# Patient Record
Sex: FEMALE | Race: WHITE | NOT HISPANIC OR LATINO | Employment: OTHER | RURAL
[De-identification: names, ages, dates, MRNs, and addresses within clinical notes are randomized per-mention and may not be internally consistent; named-entity substitution may affect disease eponyms.]

---

## 2021-02-22 ENCOUNTER — HISTORICAL (OUTPATIENT)
Dept: ADMINISTRATIVE | Facility: HOSPITAL | Age: 81
End: 2021-02-22

## 2021-02-22 LAB — SARS-COV+SARS-COV-2 AG RESP QL IA.RAPID: NEGATIVE

## 2021-04-07 ENCOUNTER — HISTORICAL (OUTPATIENT)
Dept: ADMINISTRATIVE | Facility: HOSPITAL | Age: 81
End: 2021-04-07

## 2021-04-07 LAB — SARS-COV+SARS-COV-2 AG RESP QL IA.RAPID: POSITIVE

## 2021-05-25 ENCOUNTER — HOSPITAL ENCOUNTER (EMERGENCY)
Facility: HOSPITAL | Age: 81
Discharge: NURSING, REHAB OR LONG-TERM CARE | End: 2021-05-25
Attending: INTERNAL MEDICINE
Payer: MEDICARE

## 2021-05-25 VITALS
SYSTOLIC BLOOD PRESSURE: 137 MMHG | WEIGHT: 143 LBS | TEMPERATURE: 97 F | RESPIRATION RATE: 18 BRPM | BODY MASS INDEX: 22.44 KG/M2 | OXYGEN SATURATION: 93 % | HEIGHT: 67 IN | HEART RATE: 73 BPM | DIASTOLIC BLOOD PRESSURE: 53 MMHG

## 2021-05-25 DIAGNOSIS — R11.2 NON-INTRACTABLE VOMITING WITH NAUSEA, UNSPECIFIED VOMITING TYPE: ICD-10-CM

## 2021-05-25 DIAGNOSIS — R05.9 COUGH: ICD-10-CM

## 2021-05-25 DIAGNOSIS — K56.609 SMALL BOWEL OBSTRUCTION: Primary | ICD-10-CM

## 2021-05-25 DIAGNOSIS — R10.84 GENERALIZED ABDOMINAL PAIN: ICD-10-CM

## 2021-05-25 DIAGNOSIS — I10 HYPERTENSION: ICD-10-CM

## 2021-05-25 LAB
ALBUMIN SERPL BCP-MCNC: 4 G/DL (ref 3.5–5)
ALBUMIN/GLOB SERPL: 1.1 {RATIO}
ALP SERPL-CCNC: 99 U/L (ref 55–142)
ALT SERPL W P-5'-P-CCNC: 25 U/L (ref 13–56)
ANION GAP SERPL CALCULATED.3IONS-SCNC: 16 MMOL/L (ref 7–16)
AST SERPL W P-5'-P-CCNC: 18 U/L (ref 15–37)
BACTERIA #/AREA URNS HPF: ABNORMAL /HPF
BASOPHILS # BLD AUTO: 0 K/UL (ref 0–0.2)
BASOPHILS NFR BLD AUTO: 0 % (ref 0–1)
BILIRUB SERPL-MCNC: 0.6 MG/DL (ref 0–1.2)
BILIRUB UR QL STRIP: NEGATIVE
BUN SERPL-MCNC: 28 MG/DL (ref 7–18)
BUN/CREAT SERPL: 30 (ref 6–20)
CALCIUM SERPL-MCNC: 9.9 MG/DL (ref 8.5–10.1)
CHLORIDE SERPL-SCNC: 100 MMOL/L (ref 98–107)
CLARITY UR: CLEAR
CO2 SERPL-SCNC: 28 MMOL/L (ref 21–32)
COLOR UR: ABNORMAL
CREAT SERPL-MCNC: 0.94 MG/DL (ref 0.55–1.02)
DIFFERENTIAL METHOD BLD: ABNORMAL
EOSINOPHIL # BLD AUTO: 0 K/UL (ref 0–0.5)
EOSINOPHIL NFR BLD AUTO: 0 % (ref 1–4)
ERYTHROCYTE [DISTWIDTH] IN BLOOD BY AUTOMATED COUNT: 13.2 % (ref 11.5–14.5)
FINE GRAN CASTS #/AREA URNS LPF: ABNORMAL /LPF
GLOBULIN SER-MCNC: 3.5 G/DL (ref 2–4)
GLUCOSE SERPL-MCNC: 148 MG/DL (ref 74–106)
GLUCOSE UR STRIP-MCNC: NEGATIVE MG/DL
HCT VFR BLD AUTO: 42.8 % (ref 38–47)
HGB BLD-MCNC: 15.1 G/DL (ref 12–16)
HYALINE CASTS #/AREA URNS LPF: ABNORMAL /LPF
IMM GRANULOCYTES # BLD AUTO: 0.02 K/UL (ref 0–0.04)
IMM GRANULOCYTES NFR BLD: 0.2 % (ref 0–0.4)
KETONES UR STRIP-SCNC: 15 MG/DL
LEUKOCYTE ESTERASE UR QL STRIP: NEGATIVE
LIPASE SERPL-CCNC: 50 U/L (ref 73–393)
LYMPHOCYTES # BLD AUTO: 0.87 K/UL (ref 1–4.8)
LYMPHOCYTES NFR BLD AUTO: 7.9 % (ref 27–41)
MCH RBC QN AUTO: 31.7 PG (ref 27–31)
MCHC RBC AUTO-ENTMCNC: 35.3 G/DL (ref 32–36)
MCV RBC AUTO: 89.7 FL (ref 80–96)
MONOCYTES # BLD AUTO: 0.68 K/UL (ref 0–0.8)
MONOCYTES NFR BLD AUTO: 6.2 % (ref 2–6)
MPC BLD CALC-MCNC: 9.4 FL (ref 9.4–12.4)
MUCOUS THREADS #/AREA URNS HPF: ABNORMAL /HPF
NEUTROPHILS # BLD AUTO: 9.46 K/UL (ref 1.8–7.7)
NEUTROPHILS NFR BLD AUTO: 85.7 % (ref 53–65)
NITRITE UR QL STRIP: NEGATIVE
PH UR STRIP: 5.5 PH UNITS
PLATELET # BLD AUTO: 272 K/UL (ref 150–400)
POTASSIUM SERPL-SCNC: 3.6 MMOL/L (ref 3.5–5.1)
PROT SERPL-MCNC: 7.5 G/DL (ref 6.4–8.2)
PROT UR QL STRIP: 100
RBC # BLD AUTO: 4.77 M/UL (ref 4.2–5.4)
RBC # UR STRIP: ABNORMAL /UL
RBC #/AREA URNS HPF: ABNORMAL /HPF
SODIUM SERPL-SCNC: 140 MMOL/L (ref 136–145)
SP GR UR STRIP: 1.02
SQUAMOUS #/AREA URNS LPF: ABNORMAL /LPF
TRANS CELLS #/AREA URNS LPF: ABNORMAL /LPF
TROPONIN I SERPL-MCNC: <0.017 NG/ML
UROBILINOGEN UR STRIP-ACNC: 0.2 MG/DL
WBC # BLD AUTO: 11.03 K/UL (ref 4.5–11)
WBC #/AREA URNS HPF: ABNORMAL /HPF

## 2021-05-25 PROCEDURE — 25500020 PHARM REV CODE 255: Performed by: INTERNAL MEDICINE

## 2021-05-25 PROCEDURE — 36415 COLL VENOUS BLD VENIPUNCTURE: CPT | Performed by: INTERNAL MEDICINE

## 2021-05-25 PROCEDURE — 96375 TX/PRO/DX INJ NEW DRUG ADDON: CPT

## 2021-05-25 PROCEDURE — 99284 PR EMERGENCY DEPT VISIT,LEVEL IV: ICD-10-PCS | Mod: 25,,, | Performed by: INTERNAL MEDICINE

## 2021-05-25 PROCEDURE — 63600175 PHARM REV CODE 636 W HCPCS: Performed by: INTERNAL MEDICINE

## 2021-05-25 PROCEDURE — 25000003 PHARM REV CODE 250: Performed by: INTERNAL MEDICINE

## 2021-05-25 PROCEDURE — 83690 ASSAY OF LIPASE: CPT | Performed by: INTERNAL MEDICINE

## 2021-05-25 PROCEDURE — 96376 TX/PRO/DX INJ SAME DRUG ADON: CPT

## 2021-05-25 PROCEDURE — 81003 URINALYSIS AUTO W/O SCOPE: CPT | Performed by: INTERNAL MEDICINE

## 2021-05-25 PROCEDURE — 93010 EKG 12-LEAD: ICD-10-PCS | Mod: ,,, | Performed by: INTERNAL MEDICINE

## 2021-05-25 PROCEDURE — 93010 ELECTROCARDIOGRAM REPORT: CPT | Mod: ,,, | Performed by: INTERNAL MEDICINE

## 2021-05-25 PROCEDURE — 63600175 PHARM REV CODE 636 W HCPCS: Performed by: SPECIALIST

## 2021-05-25 PROCEDURE — 96374 THER/PROPH/DIAG INJ IV PUSH: CPT

## 2021-05-25 PROCEDURE — C9113 INJ PANTOPRAZOLE SODIUM, VIA: HCPCS | Performed by: INTERNAL MEDICINE

## 2021-05-25 PROCEDURE — 81001 URINALYSIS AUTO W/SCOPE: CPT | Performed by: INTERNAL MEDICINE

## 2021-05-25 PROCEDURE — 93005 ELECTROCARDIOGRAM TRACING: CPT

## 2021-05-25 PROCEDURE — 99285 EMERGENCY DEPT VISIT HI MDM: CPT | Mod: 25

## 2021-05-25 PROCEDURE — 99284 EMERGENCY DEPT VISIT MOD MDM: CPT | Mod: 25,,, | Performed by: INTERNAL MEDICINE

## 2021-05-25 PROCEDURE — 80053 COMPREHEN METABOLIC PANEL: CPT | Performed by: INTERNAL MEDICINE

## 2021-05-25 PROCEDURE — 85025 COMPLETE CBC W/AUTO DIFF WBC: CPT | Performed by: INTERNAL MEDICINE

## 2021-05-25 PROCEDURE — 84484 ASSAY OF TROPONIN QUANT: CPT | Performed by: INTERNAL MEDICINE

## 2021-05-25 RX ORDER — LIDOCAINE HYDROCHLORIDE 10 MG/ML
1 INJECTION, SOLUTION EPIDURAL; INFILTRATION; INTRACAUDAL; PERINEURAL ONCE
Status: DISCONTINUED | OUTPATIENT
Start: 2021-05-25 | End: 2021-05-25

## 2021-05-25 RX ORDER — DEXTROSE MONOHYDRATE, SODIUM CHLORIDE, AND POTASSIUM CHLORIDE 50; 1.49; 4.5 G/1000ML; G/1000ML; G/1000ML
INJECTION, SOLUTION INTRAVENOUS CONTINUOUS
Status: DISCONTINUED | OUTPATIENT
Start: 2021-05-25 | End: 2021-05-27

## 2021-05-25 RX ORDER — POLYETHYLENE GLYCOL 3350 17 G/17G
POWDER, FOR SOLUTION ORAL DAILY
COMMUNITY
End: 2021-05-25 | Stop reason: HOSPADM

## 2021-05-25 RX ORDER — TALC
6 POWDER (GRAM) TOPICAL NIGHTLY PRN
Status: DISCONTINUED | OUTPATIENT
Start: 2021-05-25 | End: 2021-05-27 | Stop reason: HOSPADM

## 2021-05-25 RX ORDER — OMEPRAZOLE 20 MG/1
20 TABLET, DELAYED RELEASE ORAL
COMMUNITY
End: 2023-12-11 | Stop reason: CLARIF

## 2021-05-25 RX ORDER — ONDANSETRON 2 MG/ML
4 INJECTION INTRAMUSCULAR; INTRAVENOUS
Status: COMPLETED | OUTPATIENT
Start: 2021-05-25 | End: 2021-05-25

## 2021-05-25 RX ORDER — MECLIZINE HYDROCHLORIDE 25 MG/1
25 TABLET ORAL
COMMUNITY

## 2021-05-25 RX ORDER — ACETAMINOPHEN 325 MG/1
650 TABLET ORAL EVERY 8 HOURS PRN
Status: DISCONTINUED | OUTPATIENT
Start: 2021-05-25 | End: 2021-05-27 | Stop reason: HOSPADM

## 2021-05-25 RX ORDER — MORPHINE SULFATE 2 MG/ML
2 INJECTION, SOLUTION INTRAMUSCULAR; INTRAVENOUS
Status: COMPLETED | OUTPATIENT
Start: 2021-05-25 | End: 2021-05-25

## 2021-05-25 RX ORDER — ACETAMINOPHEN 650 MG/1
650 SUPPOSITORY RECTAL EVERY 4 HOURS PRN
Status: DISCONTINUED | OUTPATIENT
Start: 2021-05-25 | End: 2021-05-27 | Stop reason: HOSPADM

## 2021-05-25 RX ORDER — ONDANSETRON 2 MG/ML
4 INJECTION INTRAMUSCULAR; INTRAVENOUS EVERY 12 HOURS PRN
Status: DISCONTINUED | OUTPATIENT
Start: 2021-05-25 | End: 2021-05-27 | Stop reason: HOSPADM

## 2021-05-25 RX ORDER — PANTOPRAZOLE SODIUM 40 MG/10ML
40 INJECTION, POWDER, LYOPHILIZED, FOR SOLUTION INTRAVENOUS
Status: COMPLETED | OUTPATIENT
Start: 2021-05-25 | End: 2021-05-25

## 2021-05-25 RX ORDER — LIDOCAINE HYDROCHLORIDE 10 MG/ML
1 INJECTION INFILTRATION; PERINEURAL ONCE
Status: DISCONTINUED | OUTPATIENT
Start: 2021-05-25 | End: 2021-05-27 | Stop reason: HOSPADM

## 2021-05-25 RX ORDER — LATANOPROST 50 UG/ML
1 SOLUTION/ DROPS OPHTHALMIC
COMMUNITY

## 2021-05-25 RX ORDER — SODIUM CHLORIDE 9 MG/ML
INJECTION, SOLUTION INTRAVENOUS
Status: COMPLETED | OUTPATIENT
Start: 2021-05-25 | End: 2021-05-25

## 2021-05-25 RX ADMIN — MORPHINE SULFATE 2 MG: 2 INJECTION, SOLUTION INTRAMUSCULAR; INTRAVENOUS at 08:05

## 2021-05-25 RX ADMIN — PANTOPRAZOLE SODIUM 40 MG: 40 INJECTION, POWDER, FOR SOLUTION INTRAVENOUS at 06:05

## 2021-05-25 RX ADMIN — ONDANSETRON 4 MG: 2 INJECTION INTRAMUSCULAR; INTRAVENOUS at 08:05

## 2021-05-25 RX ADMIN — SODIUM CHLORIDE: 0.9 INJECTION, SOLUTION INTRAVENOUS at 06:05

## 2021-05-25 RX ADMIN — IOPAMIDOL 85 ML: 755 INJECTION, SOLUTION INTRAVENOUS at 04:05

## 2021-05-25 RX ADMIN — ONDANSETRON 4 MG: 2 INJECTION INTRAMUSCULAR; INTRAVENOUS at 03:05

## 2021-05-26 ENCOUNTER — HOSPITAL ENCOUNTER (INPATIENT)
Facility: HOSPITAL | Age: 81
LOS: 1 days | Discharge: HOME | DRG: 390 | End: 2021-05-27
Attending: SURGERY | Admitting: SURGERY
Payer: MEDICARE

## 2021-05-26 DIAGNOSIS — K56.609 SBO (SMALL BOWEL OBSTRUCTION): Primary | ICD-10-CM

## 2021-05-26 LAB
ANION GAP SERPL CALCULATED.3IONS-SCNC: 10 MMOL/L (ref 7–16)
BUN SERPL-MCNC: 27 MG/DL (ref 7–18)
BUN/CREAT SERPL: 36 (ref 6–20)
CALCIUM SERPL-MCNC: 9 MG/DL (ref 8.5–10.1)
CHLORIDE SERPL-SCNC: 102 MMOL/L (ref 98–107)
CO2 SERPL-SCNC: 29 MMOL/L (ref 21–32)
CREAT SERPL-MCNC: 0.74 MG/DL (ref 0.55–1.02)
GLUCOSE SERPL-MCNC: 148 MG/DL (ref 74–106)
POTASSIUM SERPL-SCNC: 3.5 MMOL/L (ref 3.5–5.1)
SODIUM SERPL-SCNC: 137 MMOL/L (ref 136–145)

## 2021-05-26 PROCEDURE — 63600175 PHARM REV CODE 636 W HCPCS: Performed by: NURSE PRACTITIONER

## 2021-05-26 PROCEDURE — 25000003 PHARM REV CODE 250: Performed by: SURGERY

## 2021-05-26 PROCEDURE — 80048 BASIC METABOLIC PNL TOTAL CA: CPT | Performed by: SURGERY

## 2021-05-26 PROCEDURE — C9113 INJ PANTOPRAZOLE SODIUM, VIA: HCPCS | Performed by: NURSE PRACTITIONER

## 2021-05-26 PROCEDURE — 11000001 HC ACUTE MED/SURG PRIVATE ROOM

## 2021-05-26 PROCEDURE — 99222 PR INITIAL HOSPITAL CARE,LEVL II: ICD-10-PCS | Mod: ,,, | Performed by: NURSE PRACTITIONER

## 2021-05-26 PROCEDURE — 36415 COLL VENOUS BLD VENIPUNCTURE: CPT | Performed by: SURGERY

## 2021-05-26 PROCEDURE — 63600175 PHARM REV CODE 636 W HCPCS: Performed by: SURGERY

## 2021-05-26 PROCEDURE — 94761 N-INVAS EAR/PLS OXIMETRY MLT: CPT | Mod: GY

## 2021-05-26 PROCEDURE — 99222 1ST HOSP IP/OBS MODERATE 55: CPT | Mod: ,,, | Performed by: NURSE PRACTITIONER

## 2021-05-26 RX ORDER — PANTOPRAZOLE SODIUM 40 MG/10ML
40 INJECTION, POWDER, LYOPHILIZED, FOR SOLUTION INTRAVENOUS DAILY
Status: DISCONTINUED | OUTPATIENT
Start: 2021-05-26 | End: 2021-05-27

## 2021-05-26 RX ORDER — MORPHINE SULFATE 4 MG/ML
4 INJECTION, SOLUTION INTRAMUSCULAR; INTRAVENOUS EVERY 4 HOURS PRN
Status: DISCONTINUED | OUTPATIENT
Start: 2021-05-26 | End: 2021-05-27 | Stop reason: HOSPADM

## 2021-05-26 RX ORDER — ENOXAPARIN SODIUM 100 MG/ML
40 INJECTION SUBCUTANEOUS EVERY 24 HOURS
Status: DISCONTINUED | OUTPATIENT
Start: 2021-05-26 | End: 2021-05-27 | Stop reason: HOSPADM

## 2021-05-26 RX ADMIN — POTASSIUM CHLORIDE, DEXTROSE MONOHYDRATE AND SODIUM CHLORIDE: 150; 5; 450 INJECTION, SOLUTION INTRAVENOUS at 08:05

## 2021-05-26 RX ADMIN — POTASSIUM CHLORIDE, DEXTROSE MONOHYDRATE AND SODIUM CHLORIDE 125 ML/HR: 150; 5; 450 INJECTION, SOLUTION INTRAVENOUS at 12:05

## 2021-05-26 RX ADMIN — ACETAMINOPHEN 650 MG: 325 TABLET ORAL at 06:05

## 2021-05-26 RX ADMIN — ONDANSETRON 4 MG: 2 INJECTION INTRAMUSCULAR; INTRAVENOUS at 04:05

## 2021-05-26 RX ADMIN — ENOXAPARIN SODIUM 40 MG: 40 INJECTION SUBCUTANEOUS at 05:05

## 2021-05-26 RX ADMIN — MORPHINE SULFATE 4 MG: 4 INJECTION INTRAVENOUS at 08:05

## 2021-05-26 RX ADMIN — POTASSIUM CHLORIDE, DEXTROSE MONOHYDRATE AND SODIUM CHLORIDE: 150; 5; 450 INJECTION, SOLUTION INTRAVENOUS at 05:05

## 2021-05-26 RX ADMIN — PANTOPRAZOLE SODIUM 40 MG: 40 INJECTION, POWDER, FOR SOLUTION INTRAVENOUS at 08:05

## 2021-05-27 VITALS
RESPIRATION RATE: 16 BRPM | WEIGHT: 140 LBS | HEART RATE: 55 BPM | HEIGHT: 67 IN | TEMPERATURE: 97 F | BODY MASS INDEX: 21.97 KG/M2 | OXYGEN SATURATION: 99 % | SYSTOLIC BLOOD PRESSURE: 157 MMHG | DIASTOLIC BLOOD PRESSURE: 70 MMHG

## 2021-05-27 PROBLEM — K56.609 SBO (SMALL BOWEL OBSTRUCTION): Status: RESOLVED | Noted: 2021-05-25 | Resolved: 2021-05-27

## 2021-05-27 LAB
ANION GAP SERPL CALCULATED.3IONS-SCNC: 3 MMOL/L (ref 7–16)
BASOPHILS # BLD AUTO: 0.01 K/UL (ref 0–0.2)
BASOPHILS NFR BLD AUTO: 0.2 % (ref 0–1)
BUN SERPL-MCNC: 12 MG/DL (ref 7–18)
BUN/CREAT SERPL: 20 (ref 6–20)
CALCIUM SERPL-MCNC: 8.8 MG/DL (ref 8.5–10.1)
CHLORIDE SERPL-SCNC: 106 MMOL/L (ref 98–107)
CO2 SERPL-SCNC: 30 MMOL/L (ref 21–32)
CREAT SERPL-MCNC: 0.59 MG/DL (ref 0.55–1.02)
DIFFERENTIAL METHOD BLD: ABNORMAL
EOSINOPHIL # BLD AUTO: 0.06 K/UL (ref 0–0.5)
EOSINOPHIL NFR BLD AUTO: 1.2 % (ref 1–4)
ERYTHROCYTE [DISTWIDTH] IN BLOOD BY AUTOMATED COUNT: 13 % (ref 11.5–14.5)
GLUCOSE SERPL-MCNC: 112 MG/DL (ref 74–106)
HCT VFR BLD AUTO: 35 % (ref 38–47)
HGB BLD-MCNC: 11.4 G/DL (ref 12–16)
IMM GRANULOCYTES # BLD AUTO: 0.01 K/UL (ref 0–0.04)
IMM GRANULOCYTES NFR BLD: 0.2 % (ref 0–0.4)
LYMPHOCYTES # BLD AUTO: 1.42 K/UL (ref 1–4.8)
LYMPHOCYTES NFR BLD AUTO: 27.7 % (ref 27–41)
MCH RBC QN AUTO: 31.2 PG (ref 27–31)
MCHC RBC AUTO-ENTMCNC: 32.6 G/DL (ref 32–36)
MCV RBC AUTO: 95.9 FL (ref 80–96)
MONOCYTES # BLD AUTO: 0.53 K/UL (ref 0–0.8)
MONOCYTES NFR BLD AUTO: 10.3 % (ref 2–6)
MPC BLD CALC-MCNC: 9.9 FL (ref 9.4–12.4)
NEUTROPHILS # BLD AUTO: 3.1 K/UL (ref 1.8–7.7)
NEUTROPHILS NFR BLD AUTO: 60.4 % (ref 53–65)
NRBC # BLD AUTO: 0 X10E3/UL
NRBC, AUTO (.00): 0 %
PLATELET # BLD AUTO: 177 K/UL (ref 150–400)
POTASSIUM SERPL-SCNC: 4 MMOL/L (ref 3.5–5.1)
RBC # BLD AUTO: 3.65 M/UL (ref 4.2–5.4)
SODIUM SERPL-SCNC: 135 MMOL/L (ref 136–145)
WBC # BLD AUTO: 5.13 K/UL (ref 4.5–11)

## 2021-05-27 PROCEDURE — 63600175 PHARM REV CODE 636 W HCPCS: Performed by: NURSE PRACTITIONER

## 2021-05-27 PROCEDURE — 85025 COMPLETE CBC W/AUTO DIFF WBC: CPT | Performed by: NURSE PRACTITIONER

## 2021-05-27 PROCEDURE — 25000003 PHARM REV CODE 250: Performed by: SURGERY

## 2021-05-27 PROCEDURE — 63600175 PHARM REV CODE 636 W HCPCS: Performed by: SURGERY

## 2021-05-27 PROCEDURE — 80048 BASIC METABOLIC PNL TOTAL CA: CPT | Performed by: NURSE PRACTITIONER

## 2021-05-27 PROCEDURE — 36415 COLL VENOUS BLD VENIPUNCTURE: CPT | Performed by: NURSE PRACTITIONER

## 2021-05-27 PROCEDURE — C9113 INJ PANTOPRAZOLE SODIUM, VIA: HCPCS | Performed by: NURSE PRACTITIONER

## 2021-05-27 RX ORDER — PANTOPRAZOLE SODIUM 40 MG/1
40 TABLET, DELAYED RELEASE ORAL DAILY
Status: DISCONTINUED | OUTPATIENT
Start: 2021-05-28 | End: 2021-05-27 | Stop reason: HOSPADM

## 2021-05-27 RX ADMIN — ENOXAPARIN SODIUM 40 MG: 40 INJECTION SUBCUTANEOUS at 05:05

## 2021-05-27 RX ADMIN — ONDANSETRON 4 MG: 2 INJECTION INTRAMUSCULAR; INTRAVENOUS at 03:05

## 2021-05-27 RX ADMIN — PANTOPRAZOLE SODIUM 40 MG: 40 INJECTION, POWDER, FOR SOLUTION INTRAVENOUS at 10:05

## 2021-05-27 RX ADMIN — POTASSIUM CHLORIDE, DEXTROSE MONOHYDRATE AND SODIUM CHLORIDE: 150; 5; 450 INJECTION, SOLUTION INTRAVENOUS at 02:05

## 2021-06-16 ENCOUNTER — HOSPITAL ENCOUNTER (OUTPATIENT)
Dept: RADIOLOGY | Facility: HOSPITAL | Age: 81
Discharge: HOME | End: 2021-06-16
Attending: ORTHOPAEDIC SURGERY
Payer: MEDICARE

## 2021-06-16 DIAGNOSIS — M79.672 FOOT PAIN, LEFT: ICD-10-CM

## 2021-06-16 PROBLEM — S92.352A DISPLACED FRACTURE OF FIFTH METATARSAL BONE, LEFT FOOT, INITIAL ENCOUNTER FOR CLOSED FRACTURE: Status: ACTIVE | Noted: 2021-06-16

## 2022-12-29 ENCOUNTER — HOSPITAL ENCOUNTER (EMERGENCY)
Facility: HOSPITAL | Age: 82
Discharge: HOME | End: 2022-12-29
Attending: EMERGENCY MEDICINE
Payer: MEDICARE

## 2022-12-29 VITALS
RESPIRATION RATE: 20 BRPM | SYSTOLIC BLOOD PRESSURE: 166 MMHG | OXYGEN SATURATION: 98 % | DIASTOLIC BLOOD PRESSURE: 70 MMHG | HEIGHT: 67 IN | HEART RATE: 72 BPM | WEIGHT: 139.5 LBS | BODY MASS INDEX: 21.9 KG/M2 | TEMPERATURE: 98 F

## 2022-12-29 DIAGNOSIS — U07.1 COVID: Primary | ICD-10-CM

## 2022-12-29 LAB
ALBUMIN SERPL BCP-MCNC: 3.6 G/DL (ref 3.5–5)
ALBUMIN/GLOB SERPL: 1.2 {RATIO}
ALP SERPL-CCNC: 80 U/L (ref 55–142)
ALT SERPL W P-5'-P-CCNC: 20 U/L (ref 13–56)
ANION GAP SERPL CALCULATED.3IONS-SCNC: 12 MMOL/L (ref 7–16)
AST SERPL W P-5'-P-CCNC: 18 U/L (ref 15–37)
BACTERIA #/AREA URNS HPF: ABNORMAL /HPF
BASOPHILS # BLD AUTO: 0.01 K/UL (ref 0–0.2)
BASOPHILS NFR BLD AUTO: 0.3 % (ref 0–1)
BILIRUB SERPL-MCNC: 0.3 MG/DL (ref ?–1.2)
BILIRUB UR QL STRIP: NEGATIVE
BUN SERPL-MCNC: 11 MG/DL (ref 7–18)
BUN/CREAT SERPL: 17 (ref 6–20)
CALCIUM SERPL-MCNC: 8.9 MG/DL (ref 8.5–10.1)
CHLORIDE SERPL-SCNC: 101 MMOL/L (ref 98–107)
CLARITY UR: CLEAR
CO2 SERPL-SCNC: 28 MMOL/L (ref 21–32)
COLOR UR: YELLOW
CREAT SERPL-MCNC: 0.63 MG/DL (ref 0.55–1.02)
DIFFERENTIAL METHOD BLD: ABNORMAL
EGFR (NO RACE VARIABLE) (RUSH/TITUS): 90 ML/MIN/1.73M²
EOSINOPHIL # BLD AUTO: 0 K/UL (ref 0–0.5)
EOSINOPHIL NFR BLD AUTO: 0 % (ref 1–4)
ERYTHROCYTE [DISTWIDTH] IN BLOOD BY AUTOMATED COUNT: 12.9 % (ref 11.5–14.5)
FLUAV AG UPPER RESP QL IA.RAPID: NEGATIVE
FLUBV AG UPPER RESP QL IA.RAPID: NEGATIVE
GLOBULIN SER-MCNC: 2.9 G/DL (ref 2–4)
GLUCOSE SERPL-MCNC: 98 MG/DL (ref 74–106)
GLUCOSE UR STRIP-MCNC: NEGATIVE MG/DL
HCT VFR BLD AUTO: 37.1 % (ref 38–47)
HGB BLD-MCNC: 12.4 G/DL (ref 12–16)
IMM GRANULOCYTES # BLD AUTO: 0 K/UL (ref 0–0.04)
IMM GRANULOCYTES NFR BLD: 0 % (ref 0–0.4)
KETONES UR STRIP-SCNC: NEGATIVE MG/DL
LEUKOCYTE ESTERASE UR QL STRIP: NEGATIVE
LYMPHOCYTES # BLD AUTO: 0.69 K/UL (ref 1–4.8)
LYMPHOCYTES NFR BLD AUTO: 18.4 % (ref 27–41)
MCH RBC QN AUTO: 31.1 PG (ref 27–31)
MCHC RBC AUTO-ENTMCNC: 33.4 G/DL (ref 32–36)
MCV RBC AUTO: 93 FL (ref 80–96)
MONOCYTES # BLD AUTO: 0.57 K/UL (ref 0–0.8)
MONOCYTES NFR BLD AUTO: 15.2 % (ref 2–6)
MPC BLD CALC-MCNC: 9.3 FL (ref 9.4–12.4)
NEUTROPHILS # BLD AUTO: 2.49 K/UL (ref 1.8–7.7)
NEUTROPHILS NFR BLD AUTO: 66.1 % (ref 53–65)
NITRITE UR QL STRIP: NEGATIVE
PH UR STRIP: 6.5 PH UNITS
PLATELET # BLD AUTO: 187 K/UL (ref 150–400)
POTASSIUM SERPL-SCNC: 3.6 MMOL/L (ref 3.5–5.1)
PROT SERPL-MCNC: 6.5 G/DL (ref 6.4–8.2)
PROT UR QL STRIP: 30
RAPID GROUP A STREP: NEGATIVE
RBC # BLD AUTO: 3.99 M/UL (ref 4.2–5.4)
RBC # UR STRIP: ABNORMAL /UL
RBC #/AREA URNS HPF: ABNORMAL /HPF
SARS-COV+SARS-COV-2 AG RESP QL IA.RAPID: POSITIVE
SODIUM SERPL-SCNC: 137 MMOL/L (ref 136–145)
SP GR UR STRIP: 1.02
SQUAMOUS #/AREA URNS LPF: ABNORMAL /LPF
UROBILINOGEN UR STRIP-ACNC: 0.2 MG/DL
WBC # BLD AUTO: 3.76 K/UL (ref 4.5–11)
WBC #/AREA URNS HPF: ABNORMAL /HPF

## 2022-12-29 PROCEDURE — 94640 AIRWAY INHALATION TREATMENT: CPT

## 2022-12-29 PROCEDURE — 63600175 PHARM REV CODE 636 W HCPCS: Performed by: EMERGENCY MEDICINE

## 2022-12-29 PROCEDURE — 80053 COMPREHEN METABOLIC PANEL: CPT | Performed by: EMERGENCY MEDICINE

## 2022-12-29 PROCEDURE — 96372 THER/PROPH/DIAG INJ SC/IM: CPT | Performed by: EMERGENCY MEDICINE

## 2022-12-29 PROCEDURE — 36415 COLL VENOUS BLD VENIPUNCTURE: CPT | Performed by: EMERGENCY MEDICINE

## 2022-12-29 PROCEDURE — 85025 COMPLETE CBC W/AUTO DIFF WBC: CPT | Performed by: EMERGENCY MEDICINE

## 2022-12-29 PROCEDURE — 87081 CULTURE SCREEN ONLY: CPT | Performed by: EMERGENCY MEDICINE

## 2022-12-29 PROCEDURE — 81001 URINALYSIS AUTO W/SCOPE: CPT | Performed by: EMERGENCY MEDICINE

## 2022-12-29 PROCEDURE — 99284 EMERGENCY DEPT VISIT MOD MDM: CPT | Mod: EDII,,, | Performed by: EMERGENCY MEDICINE

## 2022-12-29 PROCEDURE — 87880 STREP A ASSAY W/OPTIC: CPT | Performed by: EMERGENCY MEDICINE

## 2022-12-29 PROCEDURE — 99284 EMERGENCY DEPT VISIT MOD MDM: CPT | Mod: 25

## 2022-12-29 PROCEDURE — 94760 N-INVAS EAR/PLS OXIMETRY 1: CPT

## 2022-12-29 PROCEDURE — 87428 SARSCOV & INF VIR A&B AG IA: CPT | Performed by: EMERGENCY MEDICINE

## 2022-12-29 PROCEDURE — 99284 PR EMERGENCY DEPT VISIT,LEVEL IV: ICD-10-PCS | Mod: EDII,,, | Performed by: EMERGENCY MEDICINE

## 2022-12-29 RX ORDER — ALBUTEROL SULFATE 90 UG/1
2 AEROSOL, METERED RESPIRATORY (INHALATION) EVERY 8 HOURS
Status: DISCONTINUED | OUTPATIENT
Start: 2022-12-29 | End: 2022-12-29 | Stop reason: HOSPADM

## 2022-12-29 RX ORDER — ESCITALOPRAM OXALATE 10 MG/1
10 TABLET ORAL
COMMUNITY
Start: 2022-12-16

## 2022-12-29 RX ORDER — METHYLPREDNISOLONE SOD SUCC 125 MG
125 VIAL (EA) INJECTION
Status: COMPLETED | OUTPATIENT
Start: 2022-12-29 | End: 2022-12-29

## 2022-12-29 RX ORDER — IPRATROPIUM BROMIDE AND ALBUTEROL SULFATE 2.5; .5 MG/3ML; MG/3ML
3 SOLUTION RESPIRATORY (INHALATION)
Status: DISCONTINUED | OUTPATIENT
Start: 2022-12-29 | End: 2022-12-29

## 2022-12-29 RX ORDER — METHYLPREDNISOLONE SOD SUCC 125 MG
125 VIAL (EA) INJECTION
Status: DISCONTINUED | OUTPATIENT
Start: 2022-12-29 | End: 2022-12-29

## 2022-12-29 RX ORDER — AZITHROMYCIN 250 MG/1
500 TABLET, FILM COATED ORAL DAILY
Qty: 14 TABLET | Refills: 0 | Status: SHIPPED | OUTPATIENT
Start: 2022-12-29 | End: 2023-01-05

## 2022-12-29 RX ORDER — ALBUTEROL SULFATE 90 UG/1
2 AEROSOL, METERED RESPIRATORY (INHALATION) EVERY 6 HOURS PRN
Qty: 6.7 G | Refills: 1 | Status: SHIPPED | OUTPATIENT
Start: 2022-12-29 | End: 2023-12-11 | Stop reason: CLARIF

## 2022-12-29 RX ADMIN — METHYLPREDNISOLONE SODIUM SUCCINATE 125 MG: 125 INJECTION, POWDER, FOR SOLUTION INTRAMUSCULAR; INTRAVENOUS at 11:12

## 2022-12-29 NOTE — LETTER
Patient: Yaritza Jiang  YOB: 1940  Date: 12/29/2022 Time: 12:56 PM  Location: [facility] Emergency Department    Leaving the Hospital Against Medical Advice    Chart #:[id number]    This will certify that I, the undersigned,    ______________________________________________________________________    A patient in the above named medical center, having requested discharge and removal from the medical center against the advice of my attending physician(s), hereby release [facility], its physicians, officers and employees, severally and individually, from any and all liability of any nature whatsoever for any injury or harm or complication of any kind that may result directly or indirectly, by reason of my terminating my stay as a patient at [facility] and my departure from [facility], and hereby waive any and all rights of action I may now have or later acquire as a result of my voluntary departure from [facility] and the termination of my stay as a patient therein.    This release is made with the full knowledge of the danger that may result from the action which I am taking.      Date:_______________________                         ___________________________                                                                                    Patient/Legal Representative    Witness:        ____________________________                          ___________________________  Nurse                                                                        Physician

## 2022-12-29 NOTE — ED TRIAGE NOTES
Cough, with headache and sore throat that started 2 days ago. Pt states that she sat with a person that has tested positive for covid.

## 2022-12-31 LAB — DEPRECATED S PYO AG THROAT QL EIA: NORMAL

## 2023-01-01 NOTE — ED PROVIDER NOTES
Encounter Date: 12/29/2022  Miss Jiang is an 82year old white female, presenting with nonproductive cough, sore throat, congestion, and headache for the past 2 days. 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     History     Chief Complaint   Patient presents with    Cough    COVID-19 Concerns    Sore Throat    Headache       Review of patient's allergies indicates:   Allergen Reactions    Aspirin     Codeine     Loratadine      Past Medical History:   Diagnosis Date    Acid reflux     Colorectal cancer 20 years ago    Unspecified glaucoma      Past Surgical History:   Procedure Laterality Date    COLON SURGERY      COLOSTOMY      EYE SURGERY      HYSTERECTOMY       History reviewed. No pertinent family history.  Social History     Tobacco Use    Smoking status: Heavy Smoker    Smokeless tobacco: Never   Substance Use Topics    Alcohol use: Never    Drug use: Never     Review of Systems   Constitutional: Negative.    HENT:  Positive for congestion, sinus pain and sore throat. Negative for hearing loss, mouth sores, nosebleeds, postnasal drip, rhinorrhea, sneezing, tinnitus, trouble swallowing and voice change.    Respiratory:  Positive for cough and wheezing. Negative for apnea, choking, chest tightness, shortness of breath and stridor.    Cardiovascular: Negative.    Gastrointestinal: Negative.    Endocrine: Negative.    Genitourinary: Negative.    Musculoskeletal: Negative.    Allergic/Immunologic: Negative.    Neurological: Negative.    Hematological: Negative.    Psychiatric/Behavioral: Negative.       Physical Exam     Initial Vitals [12/29/22 0946]   BP Pulse Resp Temp SpO2   (!) 139/55 79 20 98.2 °F (36.8 °C) 98 %      MAP       --         Physical Exam    Constitutional: She appears well-developed and well-nourished. She is not diaphoretic. No distress.   HENT:   Head: Normocephalic and atraumatic.   Right Ear: External ear normal.   Left Ear:  External ear normal.   Nose: Nose normal.   Mouth/Throat: Oropharynx is clear and moist. No oropharyngeal exudate.   Eyes: Conjunctivae and EOM are normal. Pupils are equal, round, and reactive to light. Right eye exhibits no discharge. Left eye exhibits no discharge. No scleral icterus.   Neck: Neck supple. No thyromegaly present. No tracheal deviation present. No JVD present.   Normal range of motion.  Cardiovascular:  Normal rate, regular rhythm, normal heart sounds and intact distal pulses.     Exam reveals no gallop and no friction rub.       No murmur heard.  Pulmonary/Chest: No stridor. No respiratory distress. She has wheezes. She has rhonchi. She has no rales. She exhibits no tenderness.   Abdominal: Abdomen is soft. Bowel sounds are normal. She exhibits no distension. There is no abdominal tenderness. There is no rebound and no guarding.   Musculoskeletal:         General: No tenderness or edema. Normal range of motion.      Cervical back: Normal range of motion and neck supple.     Lymphadenopathy:     She has no cervical adenopathy.   Neurological: She is alert and oriented to person, place, and time. She has normal strength and normal reflexes. She displays normal reflexes. No cranial nerve deficit or sensory deficit.   Skin: Skin is warm and dry. Capillary refill takes 2 to 3 seconds.   Psychiatric: She has a normal mood and affect. Her behavior is normal. Judgment and thought content normal.       Medical Screening Exam   See Full Note    ED Course   Procedures  Labs Reviewed   SARS-COV2 (COVID) W/ FLU ANTIGEN - Abnormal; Notable for the following components:       Result Value    COVID-19 Ag Positive (*)     All other components within normal limits    Narrative:     Positive SARS-CoV antigen results indicate the presence of viral antigens; correlation with patient history and presence of clinical signs & symptoms consistent with COVID-19 are necessary to determine infection status.   URINALYSIS,  REFLEX TO URINE CULTURE - Abnormal; Notable for the following components:    Protein, UA 30 (*)     Blood, UA Trace-Intact (*)     All other components within normal limits   CBC WITH DIFFERENTIAL - Abnormal; Notable for the following components:    WBC 3.76 (*)     RBC 3.99 (*)     Hematocrit 37.1 (*)     MCH 31.1 (*)     MPV 9.3 (*)     Neutrophils % 66.1 (*)     Lymphocytes % 18.4 (*)     Lymphocytes, Absolute 0.69 (*)     Monocytes % 15.2 (*)     Eosinophils % 0.0 (*)     All other components within normal limits   URINALYSIS, MICROSCOPIC - Abnormal; Notable for the following components:    Squamous Epithelial Cells, UA Occasional (*)     All other components within normal limits   THROAT SCREEN, RAPID STREP - Normal   CULTURE, STREP A,  THROAT - Normal   CBC W/ AUTO DIFFERENTIAL    Narrative:     The following orders were created for panel order CBC Auto Differential.  Procedure                               Abnormality         Status                     ---------                               -----------         ------                     CBC with Differential[[id number]]        Abnormal            Final result                 Please view results for these tests on the individual orders.   COMPREHENSIVE METABOLIC PANEL          Imaging Results              X-Ray Chest 1 View (Final result)  Result time 12/29/22 10:39:02      Final result by Justin Feldman DO (12/29/22 10:39:02)                   Impression:      Background of chronic lung change, similar. No focal airspace opacities.      Electronically signed by: Justin Feldman  Date:    12/29/2022  Time:    10:39               Narrative:    EXAMINATION:  XR CHEST 1 VIEW    CLINICAL HISTORY:  shortness of breath;    TECHNIQUE:  XR CHEST 1 VIEW    COMPARISON:  5/25/21    FINDINGS:  No lines or tubes.    Background of chronic lung change, similar.  No focal airspace opacities.    Normal pleura.    Cardiac silhouette is similar to comparison exam.    No  obvious acute bone findings.                                       Medications   methylPREDNISolone sodium succinate injection 125 mg (125 mg Intramuscular Given 12/29/22 1135)                       Clinical Impression:   Final diagnoses:  [U07.1] COVID (Primary)        ED Disposition Condition    Discharge Stable          ED Prescriptions       Medication Sig Dispense Start Date End Date Auth. Provider    azithromycin (Z-ANTHONY) 250 MG tablet Take 2 tablets (500 mg total) by mouth once daily. Take first 2 tablets together, then 1 every day until finished. for 7 days 14 tablet 12/29/2022 1/5/2023 Adin Palmer MD    albuterol (VENTOLIN HFA) 90 mcg/actuation inhaler Inhale 2 puffs into the lungs every 6 (six) hours as needed for Wheezing. Rescue 6.7 g 12/29/2022 -- Adin Palmer MD          Follow-up Information       Follow up With Specialties Details Why Contact Info    Mara Jo MD Family Medicine  As needed [id number]  [facility]   [place] [id number] [phone]               Adin Palmer MD  01/01/23 0055

## 2023-12-11 ENCOUNTER — HOSPITAL ENCOUNTER (EMERGENCY)
Facility: HOSPITAL | Age: 83
Discharge: ANOTHER HOSPITAL | End: 2023-12-11
Attending: INTERNAL MEDICINE
Payer: MEDICARE

## 2023-12-11 VITALS
HEIGHT: 67 IN | DIASTOLIC BLOOD PRESSURE: 47 MMHG | WEIGHT: 144 LBS | RESPIRATION RATE: 18 BRPM | TEMPERATURE: 99 F | OXYGEN SATURATION: 95 % | SYSTOLIC BLOOD PRESSURE: 133 MMHG | HEART RATE: 75 BPM | BODY MASS INDEX: 22.6 KG/M2

## 2023-12-11 DIAGNOSIS — R11.2 NAUSEA & VOMITING: ICD-10-CM

## 2023-12-11 DIAGNOSIS — S80.01XA CONTUSION OF RIGHT KNEE, INITIAL ENCOUNTER: ICD-10-CM

## 2023-12-11 DIAGNOSIS — R53.1 WEAKNESS: ICD-10-CM
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
## 2023-12-11 DIAGNOSIS — K56.609 SMALL BOWEL OBSTRUCTION: Primary | ICD-10-CM

## 2023-12-11 DIAGNOSIS — N39.0 URINARY TRACT INFECTION WITHOUT HEMATURIA, SITE UNSPECIFIED: ICD-10-CM

## 2023-12-11 LAB
ALBUMIN SERPL BCP-MCNC: 4 G/DL (ref 3.5–5)
ALBUMIN/GLOB SERPL: 1.1 {RATIO}
ALP SERPL-CCNC: 102 U/L (ref 55–142)
ALT SERPL W P-5'-P-CCNC: 18 U/L (ref 13–56)
ANION GAP SERPL CALCULATED.3IONS-SCNC: 13 MMOL/L (ref 7–16)
AST SERPL W P-5'-P-CCNC: 20 U/L (ref 15–37)
BACTERIA #/AREA URNS HPF: ABNORMAL /HPF
BASOPHILS # BLD AUTO: 0.01 K/UL (ref 0–0.2)
BASOPHILS NFR BLD AUTO: 0.1 % (ref 0–1)
BILIRUB SERPL-MCNC: 0.6 MG/DL (ref ?–1.2)
BILIRUB UR QL STRIP: ABNORMAL
BUN SERPL-MCNC: 24 MG/DL (ref 7–18)
BUN/CREAT SERPL: 39 (ref 6–20)
CALCIUM SERPL-MCNC: 10 MG/DL (ref 8.5–10.1)
CHLORIDE SERPL-SCNC: 99 MMOL/L (ref 98–107)
CLARITY UR: ABNORMAL
CO2 SERPL-SCNC: 29 MMOL/L (ref 21–32)
COLOR UR: YELLOW
CREAT SERPL-MCNC: 0.62 MG/DL (ref 0.55–1.02)
DIFFERENTIAL METHOD BLD: ABNORMAL
EGFR (NO RACE VARIABLE) (RUSH/TITUS): 90 ML/MIN/1.73M2
EOSINOPHIL # BLD AUTO: 0.01 K/UL (ref 0–0.5)
EOSINOPHIL NFR BLD AUTO: 0.1 % (ref 1–4)
ERYTHROCYTE [DISTWIDTH] IN BLOOD BY AUTOMATED COUNT: 12.5 % (ref 11.5–14.5)
GLOBULIN SER-MCNC: 3.6 G/DL (ref 2–4)
GLUCOSE SERPL-MCNC: 137 MG/DL (ref 74–106)
GLUCOSE UR STRIP-MCNC: NEGATIVE MG/DL
HCT VFR BLD AUTO: 43.1 % (ref 38–47)
HGB BLD-MCNC: 14.8 G/DL (ref 12–16)
IMM GRANULOCYTES # BLD AUTO: 0.03 K/UL (ref 0–0.04)
IMM GRANULOCYTES NFR BLD: 0.3 % (ref 0–0.4)
KETONES UR STRIP-SCNC: 40 MG/DL
LEUKOCYTE ESTERASE UR QL STRIP: NEGATIVE
LIPASE SERPL-CCNC: 22 U/L (ref 16–77)
LYMPHOCYTES # BLD AUTO: 0.71 K/UL (ref 1–4.8)
LYMPHOCYTES NFR BLD AUTO: 7.8 % (ref 27–41)
MCH RBC QN AUTO: 31.2 PG (ref 27–31)
MCHC RBC AUTO-ENTMCNC: 34.3 G/DL (ref 32–36)
MCV RBC AUTO: 90.7 FL (ref 80–96)
MONOCYTES # BLD AUTO: 0.66 K/UL (ref 0–0.8)
MONOCYTES NFR BLD AUTO: 7.2 % (ref 2–6)
MPC BLD CALC-MCNC: 9.5 FL (ref 9.4–12.4)
NEUTROPHILS # BLD AUTO: 7.71 K/UL (ref 1.8–7.7)
NEUTROPHILS NFR BLD AUTO: 84.5 % (ref 53–65)
NITRITE UR QL STRIP: POSITIVE
NRBC # BLD AUTO: 0 X10E3/UL
NRBC, AUTO (.00): 0 %
PH UR STRIP: 5.5 PH UNITS
PLATELET # BLD AUTO: 270 K/UL (ref 150–400)
POTASSIUM SERPL-SCNC: 3.8 MMOL/L (ref 3.5–5.1)
PROT SERPL-MCNC: 7.6 G/DL (ref 6.4–8.2)
PROT UR QL STRIP: 100
RBC # BLD AUTO: 4.75 M/UL (ref 4.2–5.4)
RBC # UR STRIP: ABNORMAL /UL
RBC #/AREA URNS HPF: ABNORMAL /HPF
SODIUM SERPL-SCNC: 137 MMOL/L (ref 136–145)
SP GR UR STRIP: >=1.03
SQUAMOUS #/AREA URNS LPF: ABNORMAL /LPF
TROPONIN I SERPL DL<=0.01 NG/ML-MCNC: 6.5 PG/ML
UROBILINOGEN UR STRIP-ACNC: 1 MG/DL
WBC # BLD AUTO: 9.13 K/UL (ref 4.5–11)
WBC #/AREA URNS HPF: ABNORMAL /HPF

## 2023-12-11 PROCEDURE — 25000003 PHARM REV CODE 250: Performed by: INTERNAL MEDICINE

## 2023-12-11 PROCEDURE — 93010 EKG 12-LEAD: ICD-10-PCS | Mod: ,,, | Performed by: INTERNAL MEDICINE

## 2023-12-11 PROCEDURE — 81001 URINALYSIS AUTO W/SCOPE: CPT | Performed by: INTERNAL MEDICINE

## 2023-12-11 PROCEDURE — 80053 COMPREHEN METABOLIC PANEL: CPT | Performed by: INTERNAL MEDICINE

## 2023-12-11 PROCEDURE — 96375 TX/PRO/DX INJ NEW DRUG ADDON: CPT

## 2023-12-11 PROCEDURE — 93005 ELECTROCARDIOGRAM TRACING: CPT

## 2023-12-11 PROCEDURE — 85025 COMPLETE CBC W/AUTO DIFF WBC: CPT | Performed by: INTERNAL MEDICINE

## 2023-12-11 PROCEDURE — 99285 EMERGENCY DEPT VISIT HI MDM: CPT | Performed by: EMERGENCY MEDICINE

## 2023-12-11 PROCEDURE — 87186 SC STD MICRODIL/AGAR DIL: CPT | Performed by: INTERNAL MEDICINE

## 2023-12-11 PROCEDURE — 83690 ASSAY OF LIPASE: CPT | Performed by: INTERNAL MEDICINE

## 2023-12-11 PROCEDURE — 93010 ELECTROCARDIOGRAM REPORT: CPT | Mod: ,,, | Performed by: INTERNAL MEDICINE

## 2023-12-11 PROCEDURE — 96376 TX/PRO/DX INJ SAME DRUG ADON: CPT

## 2023-12-11 PROCEDURE — 96365 THER/PROPH/DIAG IV INF INIT: CPT

## 2023-12-11 PROCEDURE — 99285 EMERGENCY DEPT VISIT HI MDM: CPT | Mod: 25

## 2023-12-11 PROCEDURE — 84484 ASSAY OF TROPONIN QUANT: CPT | Performed by: INTERNAL MEDICINE

## 2023-12-11 PROCEDURE — 87077 CULTURE AEROBIC IDENTIFY: CPT | Performed by: INTERNAL MEDICINE

## 2023-12-11 PROCEDURE — 63600175 PHARM REV CODE 636 W HCPCS: Performed by: INTERNAL MEDICINE

## 2023-12-11 PROCEDURE — 96372 THER/PROPH/DIAG INJ SC/IM: CPT | Performed by: INTERNAL MEDICINE

## 2023-12-11 PROCEDURE — 96361 HYDRATE IV INFUSION ADD-ON: CPT

## 2023-12-11 PROCEDURE — 63600175 PHARM REV CODE 636 W HCPCS: Performed by: EMERGENCY MEDICINE

## 2023-12-11 RX ORDER — ORPHENADRINE CITRATE 30 MG/ML
30 INJECTION INTRAMUSCULAR; INTRAVENOUS
Status: COMPLETED | OUTPATIENT
Start: 2023-12-11 | End: 2023-12-11

## 2023-12-11 RX ORDER — MORPHINE SULFATE 2 MG/ML
2 INJECTION, SOLUTION INTRAMUSCULAR; INTRAVENOUS
Status: COMPLETED | OUTPATIENT
Start: 2023-12-11 | End: 2023-12-11

## 2023-12-11 RX ORDER — ONDANSETRON 2 MG/ML
4 INJECTION INTRAMUSCULAR; INTRAVENOUS
Status: COMPLETED | OUTPATIENT
Start: 2023-12-11 | End: 2023-12-11

## 2023-12-11 RX ORDER — PROCHLORPERAZINE EDISYLATE 5 MG/ML
5 INJECTION INTRAMUSCULAR; INTRAVENOUS
Status: COMPLETED | OUTPATIENT
Start: 2023-12-11 | End: 2023-12-11

## 2023-12-11 RX ADMIN — ONDANSETRON 4 MG: 2 INJECTION INTRAMUSCULAR; INTRAVENOUS at 04:12

## 2023-12-11 RX ADMIN — CEFTRIAXONE SODIUM 1 G: 1 INJECTION, POWDER, FOR SOLUTION INTRAMUSCULAR; INTRAVENOUS at 06:12

## 2023-12-11 RX ADMIN — ORPHENADRINE CITRATE 30 MG: 60 INJECTION INTRAMUSCULAR; INTRAVENOUS at 05:12

## 2023-12-11 RX ADMIN — SODIUM CHLORIDE 1000 ML: 9 INJECTION, SOLUTION INTRAVENOUS at 04:12

## 2023-12-11 RX ADMIN — ONDANSETRON 4 MG: 2 INJECTION INTRAMUSCULAR; INTRAVENOUS at 05:12

## 2023-12-11 RX ADMIN — MORPHINE SULFATE 2 MG: 2 INJECTION, SOLUTION INTRAMUSCULAR; INTRAVENOUS at 09:12

## 2023-12-11 RX ADMIN — PROCHLORPERAZINE EDISYLATE 5 MG: 5 INJECTION INTRAMUSCULAR; INTRAVENOUS at 09:12

## 2023-12-11 NOTE — ED TRIAGE NOTES
PT TO ER WITH C/O VOMITING TODAY AND ABD PAIN - PT STATES SHE HAS A COLOSTOMY AND SHE TOOK SOME MIRALAX LAST PM AND THIS AM - PT STATES SHE FELL ON YESTERDAY AFTER RETURNING HOME FROM [place]- PT STATES HER KNEE GAVE WAY AND SHE FELL -C/O INCREASED PAIN AND TENDERNESS TO R KNEE WITH EDEMA

## 2023-12-11 NOTE — ED PROVIDER NOTES
Encounter Date: 12/11/2023       History     Chief Complaint   Patient presents with    Vomiting    Abdominal Pain    Fall    Knee Injury     Patient comes in complaining of generalized weakness and nausea vomiting for the last 1 day.  Has gotten weak and fell and hit her right knee on yesterday.  She has had some problems with the knee for the last 4 weeks.  States he sometimes give out on her and there was fluid in it when she saw her primary care provider.  She denies any chest pain, shortness breath, fever chills, no focal neurologic deficits including incontinence urine or bowel.  Patient has colostomy bag in the history of small-bowel obstruction in the past.        Review of patient's allergies indicates:   Allergen Reactions    Aspirin     Codeine     Loratadine      Past Medical History:   Diagnosis Date    Acid reflux     Colorectal cancer 20 years ago    Unspecified glaucoma      Past Surgical History:   Procedure Laterality Date    COLON SURGERY      COLOSTOMY      EYE SURGERY      HYSTERECTOMY       History reviewed. No pertinent family history.  Social History     Tobacco Use    Smoking status: Former     Types: Cigarettes    Smokeless tobacco: Never   Substance Use Topics    Alcohol use: Never    Drug use: Never     Review of Systems   Constitutional:  Negative for fever.   HENT:  Negative for sore throat.    Respiratory:  Negative for shortness of breath.    Cardiovascular:  Negative for chest pain.   Gastrointestinal:  Negative for nausea.   Genitourinary:  Negative for dysuria.   Musculoskeletal:  Negative for back pain.   Skin:  Negative for rash.   Neurological:  Negative for weakness.   Hematological:  Does not bruise/bleed easily.       Physical Exam     Initial Vitals [12/11/23 1541]   BP Pulse Resp Temp SpO2   139/63 106 20 98.5 °F (36.9 °C) 96 %      MAP       --         Physical Exam    Vitals reviewed.  Constitutional: She appears well-developed.   Eyes: Pupils are equal, round, and  reactive to light.   Neck:   Normal range of motion.  Cardiovascular:  Normal rate, regular rhythm, normal heart sounds and normal pulses.           Pulmonary/Chest: Effort normal and breath sounds normal.   Abdominal: Abdomen is soft and flat. She exhibits distension. Bowel sounds are decreased. There is abdominal tenderness.   Has colostomy bag in place   Musculoskeletal:         General: Normal range of motion.      Cervical back: Normal range of motion.      Right knee: No swelling. Normal range of motion. Tenderness present.        Legs:       Comments: Neurovascular function normal distally, no effusion, some tenderness but no deformity     Neurological: She is alert. She has normal strength and normal reflexes. GCS eye subscore is 4. GCS verbal subscore is 5. GCS motor subscore is 6.   Skin: Skin is warm.   Psychiatric: She has a normal mood and affect.         Medical Screening Exam   See Full Note    ED Course   Procedures  Labs Reviewed   COMPREHENSIVE METABOLIC PANEL - Abnormal; Notable for the following components:       Result Value    Glucose 137 (*)     BUN 24 (*)     BUN/Creatinine Ratio 39 (*)     All other components within normal limits   URINALYSIS, REFLEX TO URINE CULTURE - Abnormal; Notable for the following components:    Nitrites, UA Positive (*)     Protein,  (*)     Ketones, UA 40 (*)     Bilirubin, UA Small (*)     Blood, UA Trace-Intact (*)     Specific Gravity, UA >=1.030 (*)     All other components within normal limits   CBC WITH DIFFERENTIAL - Abnormal; Notable for the following components:    MCH 31.2 (*)     Neutrophils % 84.5 (*)     Lymphocytes % 7.8 (*)     Monocytes % 7.2 (*)     Eosinophils % 0.1 (*)     Neutrophils, Abs 7.71 (*)     Lymphocytes, Absolute 0.71 (*)     All other components within normal limits   URINALYSIS, MICROSCOPIC - Abnormal; Notable for the following components:    WBC, UA 5-10 (*)     Bacteria, UA Many (*)     Squamous Epithelial Cells, UA Few (*)      All other components within normal limits   LIPASE - Normal   TROPONIN I - Normal   CULTURE, URINE   CBC W/ AUTO DIFFERENTIAL    Narrative:     The following orders were created for panel order CBC W/ AUTO DIFFERENTIAL.  Procedure                               Abnormality         Status                     ---------                               -----------         ------                     CBC with Differential[[id number]]        Abnormal            Final result                 Please view results for these tests on the individual orders.     EKG Readings: (Independently Interpreted)   Previous EKG: Compared with most recent EKG Previous EKG Date: 05/25/2021. Rhythm: Normal Sinus Rhythm. Heart Rate: Seventy-seven. Ectopy: No Ectopy. Conduction: Normal. ST Segments: Normal ST Segments. T Waves: Normal. Axis: Normal. Clinical Impression: Normal Sinus Rhythm and Non-specific ST Depression   Normal sinus rhythm heart rate 77, ST depression but no change from EKG in 2021       Imaging Results              CT Abdomen Pelvis  Without Contrast (Final result)  Result time 12/11/23 18:32:47      Final result by Júnior Parker MD (12/11/23 18:32:47)                   Impression:      Mild partial small bowel obstruction    Large amount of stool in the colon    Left lower quadrant colostomy      Electronically signed by: Júnior Parker  Date:    12/11/2023  Time:    18:32               Narrative:    EXAMINATION:  CT ABDOMEN AND PELVIS without contrast    CLINICAL HISTORY:  Nausea and vomiting    TECHNIQUE:  Axial CT images were obtained through the abdomen and pelvis without IV contrast.  Oral contrast was not given.  Coronal and sagittal reconstructions submitted and interpreted.  Total  mGycm.  Automated exposure control utilized.    COMPARISON:  May 25, 2021    FINDINGS:  CT abdomen:    Partially visualized lung bases are clear.    There is no evidence of pneumoperitoneum.    Stomach is moderately  distended with fluid.  There are some disproportionately dilated loops of small bowel as can be seen with partial small bowel obstruction.  No definite focal point of transition is seen with certainty.  Colon is normal in caliber.  There is moderate to large amount of retained stool in the colon.  There is a left lower quadrant colostomy.    Liver, bile ducts, spleen, pancreas, adrenal glands, kidneys, and gallbladder are generally unremarkable.    There is no aneurysm of the mildly calcified abdominal aorta.    CT pelvis:    Urinary bladder is mildly distended.  Postsurgical changes noted in the presacral region as before.    Osseous structures are unchanged.  There is some mild chronic L4 compression.  There is some prominent degenerative disc narrowing at L5-S1    No acute osseous findings.                                       X-Ray Abdomen AP 1 View (KUB) (Final result)  Result time 12/11/23 17:40:10      Final result by Júnior Parker MD (12/11/23 17:40:10)                   Impression:      Evidence of small-bowel obstruction      Electronically signed by: Júnior Parker  Date:    12/11/2023  Time:    17:40               Narrative:    EXAMINATION:  XR ABDOMEN AP 1 VIEW    CLINICAL HISTORY:  .  Nausea with vomiting, unspecified    COMPARISON:  March 13, 2021    TECHNIQUE:  AP supine abdomen    FINDINGS:  There are some disproportionately dilated loops of small bowel over the central abdomen.  Ostomy overlies the left lower quadrant.    Osseous structures are unchanged.  There is some degenerative disc disease of the spine.  Osteopenia                                       X-Ray Knee 1 or 2 View Right (Final result)  Result time 12/11/23 16:17:42      Final result by Gordon Hanna MD (12/11/23 16:17:42)                   Impression:      No acute findings detected by radiograph.      Electronically signed by: Gordon Hanna  Date:    12/11/2023  Time:    16:17               Narrative:    EXAMINATION:  XR  KNEE 1 OR 2 VIEW RIGHT    CLINICAL HISTORY:  Fall (on) (from) other stairs and steps, initial encounter    TECHNIQUE:  AP and lateral views of the right knee were performed.    COMPARISON:  10/11/2018    FINDINGS:  No fracture.  No dislocation.  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                                       Medications   sodium chloride 0.9% bolus 1,000 mL 1,000 mL (0 mLs Intravenous Stopped 12/11/23 1720)   ondansetron injection 4 mg (4 mg Intravenous Given 12/11/23 1611)   orphenadrine injection 30 mg (30 mg Intramuscular Given 12/11/23 1732)   ondansetron injection 4 mg (4 mg Intravenous Given 12/11/23 1731)   cefTRIAXone (ROCEPHIN) 1 g in dextrose 5 % in water (D5W) 100 mL IVPB (MB+) (0 g Intravenous Stopped 12/11/23 1933)   morphine injection 2 mg (2 mg Intravenous Given 12/11/23 2108)   prochlorperazine injection Soln 5 mg (5 mg Intravenous Given 12/11/23 2109)     Medical Decision Making  Patient with generalized weakness for last several days with nausea vomiting rule out dehydration, urinary tract infection, electrolyte imbalance, cardiac ischemia,    Amount and/or Complexity of Data Reviewed  Labs: ordered. Decision-making details documented in ED Course.  Radiology: ordered. Decision-making details documented in ED Course.  ECG/medicine tests:  Decision-making details documented in ED Course.  Discussion of management or test interpretation with external provider(s): Patient has small-bowel obstruction, CT scan consistent with partial small bowel obstruction.  Patient is still nausea and vomiting.  Has colostomy from colon cancer in the past.  Has been treated at [facility] in the past as well.  Will set up transfer to [facility].  Still awaiting acceptance from hospital with General surgery.  Was signed out to Dr. Merrill for final disposition.    Risk  Prescription drug management.               ED Course as of 12/12/23 1845   Mon Dec 11, 2023   1633 CBC W/  AUTO DIFFERENTIAL(!) [PW]   1633 X-Ray Knee 1 or 2 View Right [PW]   1642 Lipase: 22 [PW]   1642 Comp. Metabolic Panel(!) [PW]   1718 Troponin I High Sensitivity: 6.5 [PW]   1741 Urinalysis, Reflex to Urine Culture Urine, Clean Catch(!) [PW]   1741 NITRITE UA(!): Positive [PW]   1745 WBC, UA(!): 5-10 [PW]   1746 Bacteria, UA(!): Many [PW]   1746 X-Ray Abdomen AP 1 View (KUB) [PW]   1836 CT Abdomen Pelvis  Without Contrast [PW]   2026 X-Ray Knee 1 or 2 View Right  Review of radiologist's report for x-ray of the right knee shows no evidence of fracture. [LM]   2026 CT Abdomen Pelvis  Without Contrast [LM]   2026 CT Abdomen Pelvis  Without Contrast  Review of radiologist's report for CT of the abdomen and pelvis indicates partial small-bowel obstruction. [LM]      ED Course User Index  [LM] Arsh Merrill DO  [PW] Edilberto العراقي MD                             Clinical Impression:   Final diagnoses:  [R53.1] Weakness  [W10.8XXA] Fall (on) (from) other stairs and steps, initial encounter  [R11.2] Nausea & vomiting  [K56.609] Small bowel obstruction (Primary)  [S80.01XA] Contusion of right knee, initial encounter  [N39.0] Urinary tract infection without hematuria, site unspecified        ED Disposition Condition    Transfer to Another Facility Fair                Edilberto العراقي MD  12/11/23 3159       Edilberto العراقي MD  12/12/23 1135

## 2023-12-12 NOTE — ED NOTES
Dottie of [facility] called for pt status report for the surgeon.  She will call back with bed assignment. Power shared images to [facility] for surgeons' viewing.

## 2023-12-12 NOTE — PROVIDER PROGRESS NOTES - EMERGENCY DEPT.
Encounter Date: 12/11/2023    ED Physician Progress Notes            Patient care transferred to me at change of shift, awaiting transfer for surgical evaluation for partial small-bowel obstruction.    Patient discussed with Dr. JONAS trimble through patient flow Center, accepted in transfer to [facility] by Dr. Solitario.    Patient refused NG-tube placement for small-bowel obstruction.  Patient is complaining of severe right hip pain radiating down her right lower extremity.  She is allergic to codeine, but states she has tolerated morphine in the past.  Codeine causes her nausea.  She is also complaining of nausea.  Patient was given morphine 2 mg along with 5 mg prochlorperazine IV.

## 2023-12-13 NOTE — ADDENDUM NOTE
Encounter addended by: Edilberto العراقي MD on: 12/12/2023 6:45 PM   Actions taken: SmartForm saved, Clinical Note Signed

## 2023-12-14 LAB — UA COMPLETE W REFLEX CULTURE PNL UR: ABNORMAL

## 2023-12-18 NOTE — ADDENDUM NOTE
Encounter addended by: Beth Castillo on: 12/18/2023 1:11 PM   Actions taken: Charge Capture section accepted

## 2023-12-18 NOTE — ADDENDUM NOTE
Encounter addended by: Terra Ram on: 12/18/2023 3:11 PM   Actions taken: SmartForm saved, Flowsheet accepted, Charge Capture section accepted

## 2023-12-23 ENCOUNTER — HOSPITAL ENCOUNTER (INPATIENT)
Facility: HOSPITAL | Age: 83
LOS: 2 days | Discharge: HOME | DRG: 392 | End: 2023-12-26
Attending: EMERGENCY MEDICINE | Admitting: SURGERY
Payer: MEDICARE

## 2023-12-23 DIAGNOSIS — R10.84 GENERALIZED ABDOMINAL PAIN: ICD-10-CM

## 2023-12-23 DIAGNOSIS — K56.609 SMALL BOWEL OBSTRUCTION: Primary | ICD-10-CM

## 2023-12-23 LAB
ALBUMIN SERPL BCP-MCNC: 4.3 G/DL (ref 3.5–5)
ALBUMIN/GLOB SERPL: 1.4 {RATIO}
ALP SERPL-CCNC: 110 U/L (ref 55–142)
ALT SERPL W P-5'-P-CCNC: 23 U/L (ref 13–56)
ANION GAP SERPL CALCULATED.3IONS-SCNC: 19 MMOL/L (ref 7–16)
AST SERPL W P-5'-P-CCNC: 19 U/L (ref 15–37)
BASOPHILS # BLD AUTO: 0.05 K/UL (ref 0–0.2)
BASOPHILS NFR BLD AUTO: 0.2 % (ref 0–1)
BILIRUB SERPL-MCNC: 0.5 MG/DL (ref ?–1.2)
BILIRUB UR QL STRIP: NEGATIVE
BUN SERPL-MCNC: 16 MG/DL (ref 7–18)
BUN/CREAT SERPL: 17 (ref 6–20)
CALCIUM SERPL-MCNC: 10.7 MG/DL (ref 8.5–10.1)
CHLORIDE SERPL-SCNC: 100 MMOL/L (ref 98–107)
CLARITY UR: CLEAR
CO2 SERPL-SCNC: 21 MMOL/L (ref 21–32)
COLOR UR: NORMAL
CREAT SERPL-MCNC: 0.93 MG/DL (ref 0.55–1.02)
CRENATED CELLS: ABNORMAL
DIFFERENTIAL METHOD BLD: ABNORMAL
EGFR (NO RACE VARIABLE) (RUSH/TITUS): 62 ML/MIN/1.73M2
EOSINOPHIL # BLD AUTO: 0.02 K/UL (ref 0–0.5)
EOSINOPHIL NFR BLD AUTO: 0.1 % (ref 1–4)
ERYTHROCYTE [DISTWIDTH] IN BLOOD BY AUTOMATED COUNT: 12.4 % (ref 11.5–14.5)
GLOBULIN SER-MCNC: 3.1 G/DL (ref 2–4)
GLUCOSE SERPL-MCNC: 176 MG/DL (ref 74–106)
GLUCOSE UR STRIP-MCNC: NORMAL MG/DL
HCT VFR BLD AUTO: 45.9 % (ref 38–47)
HGB BLD-MCNC: 16.2 G/DL (ref 12–16)
IMM GRANULOCYTES # BLD AUTO: 0.16 K/UL (ref 0–0.04)
IMM GRANULOCYTES NFR BLD: 0.6 % (ref 0–0.4)
KETONES UR STRIP-SCNC: NEGATIVE MG/DL
LEUKOCYTE ESTERASE UR QL STRIP: NEGATIVE
LIPASE SERPL-CCNC: 28 U/L (ref 16–77)
LYMPHOCYTES # BLD AUTO: 0.8 K/UL (ref 1–4.8)
LYMPHOCYTES NFR BLD AUTO: 3.2 % (ref 27–41)
LYMPHOCYTES NFR BLD MANUAL: 2 % (ref 27–41)
MCH RBC QN AUTO: 31.2 PG (ref 27–31)
MCHC RBC AUTO-ENTMCNC: 35.3 G/DL (ref 32–36)
MCV RBC AUTO: 88.4 FL (ref 80–96)
MONOCYTES # BLD AUTO: 0.72 K/UL (ref 0–0.8)
MONOCYTES NFR BLD AUTO: 2.9 % (ref 2–6)
MONOCYTES NFR BLD MANUAL: 2 % (ref 2–6)
MPC BLD CALC-MCNC: 10.2 FL (ref 9.4–12.4)
NEUTROPHILS # BLD AUTO: 23.04 K/UL (ref 1.8–7.7)
NEUTROPHILS NFR BLD AUTO: 93 % (ref 53–65)
NEUTS BAND NFR BLD MANUAL: 18 % (ref 1–5)
NEUTS SEG NFR BLD MANUAL: 78 % (ref 50–62)
NITRITE UR QL STRIP: NEGATIVE
NRBC # BLD AUTO: 0 X10E3/UL
NRBC, AUTO (.00): 0 %
PH UR STRIP: 6 PH UNITS
PLATELET # BLD AUTO: 312 K/UL (ref 150–400)
PLATELET MORPHOLOGY: ABNORMAL
POTASSIUM SERPL-SCNC: 3.6 MMOL/L (ref 3.5–5.1)
PROT SERPL-MCNC: 7.4 G/DL (ref 6.4–8.2)
PROT UR QL STRIP: NEGATIVE
RBC # BLD AUTO: 5.19 M/UL (ref 4.2–5.4)
RBC # UR STRIP: NEGATIVE /UL
SODIUM SERPL-SCNC: 136 MMOL/L (ref 136–145)
SP GR UR STRIP: 1.01
UROBILINOGEN UR STRIP-ACNC: NORMAL MG/DL
WBC # BLD AUTO: 24.79 K/UL (ref 4.5–11)

## 2023-12-23 PROCEDURE — 96375 TX/PRO/DX INJ NEW DRUG ADDON: CPT

## 2023-12-23 PROCEDURE — 99285 EMERGENCY DEPT VISIT HI MDM: CPT | Mod: ,,, | Performed by: EMERGENCY MEDICINE

## 2023-12-23 PROCEDURE — 25000003 PHARM REV CODE 250: Performed by: EMERGENCY MEDICINE

## 2023-12-23 PROCEDURE — 25500020 PHARM REV CODE 255: Performed by: EMERGENCY MEDICINE

## 2023-12-23 PROCEDURE — 83690 ASSAY OF LIPASE: CPT | Performed by: EMERGENCY MEDICINE

## 2023-12-23 PROCEDURE — 99285 EMERGENCY DEPT VISIT HI MDM: CPT | Mod: 25

## 2023-12-23 PROCEDURE — 96374 THER/PROPH/DIAG INJ IV PUSH: CPT

## 2023-12-23 PROCEDURE — 85025 COMPLETE CBC W/AUTO DIFF WBC: CPT | Performed by: EMERGENCY MEDICINE

## 2023-12-23 PROCEDURE — 80053 COMPREHEN METABOLIC PANEL: CPT | Performed by: EMERGENCY MEDICINE

## 2023-12-23 PROCEDURE — 63600175 PHARM REV CODE 636 W HCPCS: Performed by: EMERGENCY MEDICINE

## 2023-12-23 PROCEDURE — 51702 INSERT TEMP BLADDER CATH: CPT

## 2023-12-23 PROCEDURE — 81003 URINALYSIS AUTO W/O SCOPE: CPT | Performed by: EMERGENCY MEDICINE

## 2023-12-23 PROCEDURE — 96361 HYDRATE IV INFUSION ADD-ON: CPT

## 2023-12-23 RX ORDER — POTASSIUM CHLORIDE 20 MEQ/1
40 TABLET, EXTENDED RELEASE ORAL
Status: DISCONTINUED | OUTPATIENT
Start: 2023-12-23 | End: 2023-12-23

## 2023-12-23 RX ORDER — MORPHINE SULFATE 2 MG/ML
1 INJECTION, SOLUTION INTRAMUSCULAR; INTRAVENOUS
Status: COMPLETED | OUTPATIENT
Start: 2023-12-23 | End: 2023-12-23

## 2023-12-23 RX ORDER — ONDANSETRON 2 MG/ML
4 INJECTION INTRAMUSCULAR; INTRAVENOUS
Status: COMPLETED | OUTPATIENT
Start: 2023-12-23 | End: 2023-12-23

## 2023-12-23 RX ADMIN — SODIUM CHLORIDE 500 ML: 9 INJECTION, SOLUTION INTRAVENOUS at 08:12

## 2023-12-23 RX ADMIN — MORPHINE SULFATE 1 MG: 2 INJECTION, SOLUTION INTRAMUSCULAR; INTRAVENOUS at 11:12

## 2023-12-23 RX ADMIN — ONDANSETRON 4 MG: 2 INJECTION INTRAMUSCULAR; INTRAVENOUS at 08:12

## 2023-12-23 RX ADMIN — IOPAMIDOL 100 ML: 755 INJECTION, SOLUTION INTRAVENOUS at 11:12

## 2023-12-24 PROBLEM — R10.84 GENERALIZED ABDOMINAL PAIN: Status: ACTIVE | Noted: 2023-12-24

## 2023-12-24 PROBLEM — D72.825 BANDEMIA: Status: ACTIVE | Noted: 2023-12-24

## 2023-12-24 PROCEDURE — 0D9670Z DRAINAGE OF STOMACH WITH DRAINAGE DEVICE, VIA NATURAL OR ARTIFICIAL OPENING: ICD-10-PCS | Performed by: SURGERY

## 2023-12-24 PROCEDURE — 99223 1ST HOSP IP/OBS HIGH 75: CPT | Mod: ,,, | Performed by: SURGERY

## 2023-12-24 PROCEDURE — 11000001 HC ACUTE MED/SURG PRIVATE ROOM

## 2023-12-24 PROCEDURE — 96361 HYDRATE IV INFUSION ADD-ON: CPT

## 2023-12-24 PROCEDURE — 99900035 HC TECH TIME PER 15 MIN (STAT)

## 2023-12-24 PROCEDURE — 25000003 PHARM REV CODE 250: Performed by: SURGERY

## 2023-12-24 PROCEDURE — 63600175 PHARM REV CODE 636 W HCPCS: Performed by: EMERGENCY MEDICINE

## 2023-12-24 PROCEDURE — 63600175 PHARM REV CODE 636 W HCPCS: Performed by: SURGERY

## 2023-12-24 RX ORDER — SODIUM CHLORIDE, SODIUM LACTATE, POTASSIUM CHLORIDE, CALCIUM CHLORIDE 600; 310; 30; 20 MG/100ML; MG/100ML; MG/100ML; MG/100ML
1000 INJECTION, SOLUTION INTRAVENOUS CONTINUOUS
Status: DISCONTINUED | OUTPATIENT
Start: 2023-12-24 | End: 2023-12-24

## 2023-12-24 RX ORDER — MORPHINE SULFATE 4 MG/ML
3 INJECTION, SOLUTION INTRAMUSCULAR; INTRAVENOUS EVERY 4 HOURS PRN
Status: DISCONTINUED | OUTPATIENT
Start: 2023-12-24 | End: 2023-12-26 | Stop reason: HOSPADM

## 2023-12-24 RX ORDER — METOCLOPRAMIDE HYDROCHLORIDE 5 MG/ML
10 INJECTION INTRAMUSCULAR; INTRAVENOUS EVERY 8 HOURS
Status: DISCONTINUED | OUTPATIENT
Start: 2023-12-24 | End: 2023-12-26 | Stop reason: HOSPADM

## 2023-12-24 RX ORDER — SODIUM CHLORIDE AND POTASSIUM CHLORIDE 150; 900 MG/100ML; MG/100ML
INJECTION, SOLUTION INTRAVENOUS CONTINUOUS
Status: DISCONTINUED | OUTPATIENT
Start: 2023-12-24 | End: 2023-12-26

## 2023-12-24 RX ORDER — ENOXAPARIN SODIUM 100 MG/ML
40 INJECTION SUBCUTANEOUS EVERY 24 HOURS
Status: DISCONTINUED | OUTPATIENT
Start: 2023-12-24 | End: 2023-12-26 | Stop reason: HOSPADM

## 2023-12-24 RX ADMIN — MORPHINE SULFATE 3 MG: 4 INJECTION, SOLUTION INTRAMUSCULAR; INTRAVENOUS at 10:12

## 2023-12-24 RX ADMIN — POTASSIUM CHLORIDE AND SODIUM CHLORIDE: 900; 150 INJECTION, SOLUTION INTRAVENOUS at 06:12

## 2023-12-24 RX ADMIN — POTASSIUM CHLORIDE AND SODIUM CHLORIDE: 900; 150 INJECTION, SOLUTION INTRAVENOUS at 10:12

## 2023-12-24 RX ADMIN — ENOXAPARIN SODIUM 40 MG: 40 INJECTION SUBCUTANEOUS at 05:12

## 2023-12-24 RX ADMIN — METOCLOPRAMIDE 10 MG: 5 INJECTION, SOLUTION INTRAMUSCULAR; INTRAVENOUS at 09:12

## 2023-12-24 RX ADMIN — METOCLOPRAMIDE 10 MG: 5 INJECTION, SOLUTION INTRAMUSCULAR; INTRAVENOUS at 02:12

## 2023-12-24 RX ADMIN — PIPERACILLIN AND TAZOBACTAM 4.5 G: 4; .5 INJECTION, POWDER, FOR SOLUTION INTRAVENOUS; PARENTERAL at 08:12

## 2023-12-24 RX ADMIN — PIPERACILLIN AND TAZOBACTAM 4.5 G: 4; .5 INJECTION, POWDER, FOR SOLUTION INTRAVENOUS; PARENTERAL at 10:12

## 2023-12-24 RX ADMIN — SODIUM CHLORIDE, POTASSIUM CHLORIDE, SODIUM LACTATE AND CALCIUM CHLORIDE 1000 ML: 600; 310; 30; 20 INJECTION, SOLUTION INTRAVENOUS at 03:12

## 2023-12-24 RX ADMIN — PIPERACILLIN AND TAZOBACTAM 4.5 G: 4; .5 INJECTION, POWDER, FOR SOLUTION INTRAVENOUS; PARENTERAL at 02:12

## 2023-12-24 RX ADMIN — MORPHINE SULFATE 3 MG: 4 INJECTION, SOLUTION INTRAMUSCULAR; INTRAVENOUS at 05:12

## 2023-12-24 NOTE — SUBJECTIVE & OBJECTIVE
No current facility-administered medications on file prior to encounter.     Current Outpatient Medications on File Prior to Encounter   Medication Sig    EScitalopram oxalate (LEXAPRO) 10 MG tablet Take 10 mg by mouth.    latanoprost 0.005 % ophthalmic solution Apply 1 drop to eye.    meclizine (ANTIVERT) 25 mg tablet Take 25 mg by mouth.       Review of patient's allergies indicates:   Allergen Reactions    Aspirin     Codeine     Loratadine        Past Medical History:   Diagnosis Date    Acid reflux     Colorectal cancer 20 years ago    Unspecified glaucoma      Past Surgical History:   Procedure Laterality Date    COLON SURGERY      COLOSTOMY      EYE SURGERY      HYSTERECTOMY       Family History    None       Tobacco Use    Smoking status: Former     Types: Cigarettes    Smokeless tobacco: Never   Substance and Sexual Activity    Alcohol use: Never    Drug use: Never    Sexual activity: Not on file     Review of Systems   Gastrointestinal:  Positive for abdominal pain.     Objective:     Vital Signs (Most Recent):  Temp: 97.9 °F (36.6 °C) (12/24/23 0336)  Pulse: 93 (12/24/23 0604)  Resp: 18 (12/23/23 2317)  BP: (!) 120/52 (12/24/23 0604)  SpO2: 95 % (12/24/23 0604) Vital Signs (24h Range):  Temp:  [97.7 °F (36.5 °C)-97.9 °F (36.6 °C)] 97.9 °F (36.6 °C)  Pulse:  [67-93] 93  Resp:  [18] 18  SpO2:  [91 %-100 %] 95 %  BP: (110-156)/(42-67) 120/52     Weight: 65.3 kg (144 lb)  Body mass index is 22.55 kg/m².     Physical Exam  Constitutional:       Appearance: Normal appearance.   Cardiovascular:      Rate and Rhythm: Normal rate.   Pulmonary:      Effort: Pulmonary effort is normal.   Abdominal:      General: Abdomen is flat. There is no distension.      Palpations: Abdomen is soft. There is no mass.      Tenderness: There is no abdominal tenderness. There is no guarding or rebound.      Hernia: No hernia is present.      Comments: Left lower quadrant ostomy with stool present   Skin:     General: Skin is warm  and dry.   Neurological:      General: No focal deficit present.      Mental Status: She is alert.   Psychiatric:         Mood and Affect: Mood normal.         Behavior: Behavior normal.         Thought Content: Thought content normal.         Judgment: Judgment normal.            I have reviewed all pertinent lab results within the past 24 hours.  CBC:   Recent Labs   Lab 12/23/23 2023   WBC 24.79*   RBC 5.19   HGB 16.2*   HCT 45.9      MCV 88.4   MCH 31.2*   MCHC 35.3     BMP:   Recent Labs   Lab 12/23/23 2023   *      K 3.6      CO2 21   BUN 16   CREATININE 0.93   CALCIUM 10.7*     Microbiology Results (last 7 days)       ** No results found for the last 168 hours. **            Significant Diagnostics:  I have reviewed all pertinent imaging results/findings within the past 24 hours.  CT: I have reviewed all pertinent results/findings within the past 24 hours and my personal findings are:  Small bowel will involved bowel loops liquid field without transition zone ostomy pros and without parastomal hernia gallbladder present vasculature kidneys without obvious abnormalities

## 2023-12-24 NOTE — ED PROVIDER NOTES
Encounter Date: 12/23/2023    SCRIBE #1 NOTE: I, Lin Good, am scribing for, and in the presence of,  Nura Zimmerman MD. I have scribed the entire note.       History     Chief Complaint   Patient presents with    Abdominal Pain    Vomiting    Nausea     Onset today at 1600 per ems     Patient is an 84 y/o female that presents to the ED via EMS with Abdominal Pain. Patient states she has been vomiting since 16:30, with back pain and stomach pain. Patient states she had a colostomy on left side from colon cancer 24 yrs ago. Patient Mhx; Acid reflux, colorectal cancer. There are no other issues to report at this time.    The history is provided by the patient and the spouse. No  was used.     Review of patient's allergies indicates:   Allergen Reactions    Aspirin     Codeine     Loratadine      Past Medical History:   Diagnosis Date    Acid reflux     Colorectal cancer 20 years ago    Unspecified glaucoma      Past Surgical History:   Procedure Laterality Date    COLON SURGERY      COLOSTOMY      EYE SURGERY      HYSTERECTOMY       History reviewed. No pertinent family history.  Social History     Tobacco Use    Smoking status: Former     Types: Cigarettes    Smokeless tobacco: Never   Substance Use Topics    Alcohol use: Never    Drug use: Never     Review of Systems   Constitutional:  Negative for fever.   Gastrointestinal:  Positive for abdominal pain, nausea and vomiting. Negative for diarrhea.   Musculoskeletal:  Positive for back pain.   Allergic/Immunologic:        Asprin, Codeine, Loratadine    All other systems reviewed and are negative.      Physical Exam     Initial Vitals [12/23/23 1909]   BP Pulse Resp Temp SpO2   133/63 68 18 97.7 °F (36.5 °C) 100 %      MAP       --         Physical Exam    Nursing note and vitals reviewed.  Constitutional: She appears well-developed.   HENT:   Head: Normocephalic and atraumatic.   Eyes: EOM are normal. Pupils are equal, round, and reactive  to light.   Cardiovascular:  Normal rate.           Pulmonary/Chest: Breath sounds normal.   Abdominal: Abdomen is soft. Bowel sounds are normal.   Colostomy of left side   Musculoskeletal:      Comments: Back Pain     Skin: Skin is warm.   Psychiatric: She has a normal mood and affect.         ED Course   Procedures  Labs Reviewed   COMPREHENSIVE METABOLIC PANEL - Abnormal; Notable for the following components:       Result Value    Anion Gap 19 (*)     Glucose 176 (*)     Calcium 10.7 (*)     All other components within normal limits   CBC WITH DIFFERENTIAL - Abnormal; Notable for the following components:    WBC 24.79 (*)     Hemoglobin 16.2 (*)     MCH 31.2 (*)     Neutrophils % 93.0 (*)     Lymphocytes % 3.2 (*)     Eosinophils % 0.1 (*)     Immature Granulocytes % 0.6 (*)     Neutrophils, Abs 23.04 (*)     Lymphocytes, Absolute 0.80 (*)     Immature Granulocytes, Absolute 0.16 (*)     All other components within normal limits   MANUAL DIFFERENTIAL - Abnormal; Notable for the following components:    Segmented Neutrophils, Man % 78 (*)     Bands, Man % 18 (*)     Lymphocytes, Man % 2 (*)     Platelet Morphology Few Large Platelets (*)     All other components within normal limits   LIPASE - Normal   CBC W/ AUTO DIFFERENTIAL    Narrative:     The following orders were created for panel order CBC W/ AUTO DIFFERENTIAL.  Procedure                               Abnormality         Status                     ---------                               -----------         ------                     CBC with Differential[[id number]]       Abnormal            Final result               Manual Differential[[id number]]         Abnormal            Final result                 Please view results for these tests on the individual orders.   URINALYSIS, REFLEX TO URINE CULTURE          Imaging Results              XR Gastric tube check, non-radiologist performed (Final result)  Result time 12/24/23 08:32:26      Final result by  Alessio Khan DO (12/24/23 08:32:26)                   Impression:      As above.    Point of Service: [facility]      Electronically signed by: Alessio Khan  Date:    12/24/2023  Time:    08:32               Narrative:    EXAMINATION:  XR GASTRIC TUBE CHECK, NON-RADIOLOGIST PERFORMED    CLINICAL HISTORY:  Small-bowel obstruction;    COMPARISON:  Acute abdominal series December 23, 2023    TECHNIQUE:  Frontal view/s of the chest and upper abdomen specifically for enteric tube placement.    FINDINGS:  Non weighted enteric tube tip projects over the mid stomach.                                       CT Abdomen Pelvis With IV Contrast NO Oral Contrast (Final result)  Result time 12/24/23 08:00:19      Final result by Alessio Khan DO (12/24/23 08:00:19)                   Impression:      Left abdominal ostomy again noted.  There is fluid throughout much of the stomach and small bowel. There is mild dilatation of portions of small bowel within the left abdomen measuring up to 3.9 cm. Findings may reflect sequela of gastroenteritis or mild partial small bowel obstruction. Recommend surgical consultation. Other/detailed findings as above.    Preliminary report was issued by [employer].    The CT exam was performed using one or more of the following dose    reduction techniques- Automated exposure control, adjustment of the mA    and/or kV according to patient size, and/or use of iterative    reconstructed technique.    Point of Service: [facility]      Electronically signed by: Alessio Khan  Date:    12/24/2023  Time:    08:00               Narrative:    EXAMINATION:  CT ABDOMEN PELVIS WITH IV CONTRAST    CLINICAL HISTORY:  Abdominal pain, acute, nonlocalized;    COMPARISON:  CT abdomen pelvis December 11, 2023    TECHNIQUE:  Multiple axial tomographic images of the abdomen and pelvis were obtained after administration of 100 cc Isovue 370 intravenous  contrast.    FINDINGS:  Mild dependent changes of the lungs noted.    No worrisome focal hepatic abnormality demonstrated on submitted images.  Visualized gallbladder grossly unremarkable.  Visualized pancreas appears unremarkable.  Duodenal diverticulum noted.  Spleen grossly unremarkable.    Bilateral adrenal glands grossly unremarkable.  Bilateral kidneys appear grossly unremarkable.  Urinary bladder incompletely distended.  Prior hysterectomy.  Similar presacral scarring/calcification.    Left abdominal ostomy again noted.  There is fluid throughout much of the stomach and small bowel.  There is mild dilatation of portions of small bowel within the left abdomen measuring up to 3.9 cm.  Findings may reflect sequela of gastroenteritis or mild partial small bowel obstruction.  Recommend surgical consultation.  Atherosclerotic calcification demonstrated.  Scattered skeletal degenerative change.                                       XR ABDOMEN, ACUTE 2 OR MORE VIEWS WITH CHEST (Final result)  Result time 12/24/23 08:37:30      Final result by Alessio Khan DO (12/24/23 08:37:30)                   Impression:      No acute cardiopulmonary process demonstrated.    Scattered fluid within the small bowel where there is mild distension with differential including sequela of enteritis as well as mild partial small bowel obstruction.    Point of Service: [facility]      Electronically signed by: Alessio Khan  Date:    12/24/2023  Time:    08:37               Narrative:    EXAMINATION:  XR ABDOMEN ACUTE 2 OR MORE VIEWS WITH CHEST    CLINICAL HISTORY:  Nausea and vomiting;    COMPARISON:  Abdominal x-ray December 11 2023    TECHNIQUE:  Single frontal view of the chest as well as frontal views of the abdomen in the supine and lateral decubitus position.    FINDINGS:  Heart size appears within normal limits.  Chronic change of the lungs without focal consolidation, pleural effusion, or pneumothorax.  Scattered  skeletal degenerative change.  No free intraperitoneal air demonstrated.  Scattered fluid within the small bowel where there is mild distension with differential including sequela of enteritis as well as mild partial small bowel obstruction.                                       Medications   enoxaparin injection 40 mg (40 mg Subcutaneous Given 12/24/23 1712)   0.9 % NaCl with KCl 20 mEq infusion ( Intravenous New Bag 12/24/23 1849)   morphine injection 3 mg (3 mg Intravenous Given 12/24/23 1732)   metoclopramide injection 10 mg (10 mg Intravenous Given 12/24/23 2134)   piperacillin-tazobactam (ZOSYN) 4.5 g in dextrose 5 % in water (D5W) 100 mL IVPB (MB+) (0 g Intravenous Stopped 12/25/23 0016)   ondansetron injection 4 mg (4 mg Intravenous Given 12/23/23 2024)   sodium chloride 0.9% bolus 500 mL 500 mL (0 mLs Intravenous Stopped 12/23/23 2124)   morphine injection 1 mg (1 mg Intravenous Given 12/23/23 2317)   iopamidoL (ISOVUE-370) injection 100 mL (100 mLs Intravenous Given 12/23/23 2318)   piperacillin-tazobactam (ZOSYN) 4.5 g in dextrose 5 % in water (D5W) 100 mL IVPB (MB+) (0 g Intravenous Stopped 12/24/23 1053)     Medical Decision Making  Amount and/or Complexity of Data Reviewed  Labs: ordered. Decision-making details documented in ED Course.  Radiology: ordered. Decision-making details documented in ED Course.              Attending Attestation:           Physician Attestation for Scribe:  Physician Attestation Statement for Scribe #1: I, Nura Zimmerman MD, reviewed documentation, as scribed by Lin Good in my presence, and it is both accurate and complete.             ED Course as of 12/25/23 0017   Sat Dec 23, 2023   2017 MDM:  83-year-old female patient with abdominal pain and vomiting and back pain that started earlier today.  The patient has history of colostomy on the left side for previous colon cancer.  Physical examination is unremarkable including soft abdominal examination. [HK]   Rhonda Dec  24, 2023   0240 CT scan abdomen and pelvis with IV contrast showed small bowel obstruction read by [employer].  We will admit the patient to General surgery [HK]   0337 CT Abdomen Pelvis With IV Contrast NO Oral Contrast [HK]      ED Course User Index  [HK] Nura Zimmerman MD                             Clinical Impression:  Final diagnoses:  [K56.609] Small bowel obstruction (Primary)          ED Disposition Condition    Admit                 Nura Zimmerman MD  12/25/23 0024

## 2023-12-24 NOTE — H&P
[facility] - Emergency Department  General Surgery  History & Physical    Patient Name: Yaritza Jiang  MRN: [id number]  Admission Date: 12/23/2023  Attending Physician: Ana James MD  Primary Care Provider: Mara Jo MD    Patient information was obtained from patient and ER records.     Subjective:     Chief Complaint/Reason for Admission:  Abdominal pain, leukocytosis    History of Present Illness: Patient is status crampy abdominal pain no nausea vomiting states her colostomy has been working    Previous admission 12/11/2023 diagnosed with a UTI transferred to [facility] was treated conservatively for concern for bowel obstruction apparently symptoms resolved and subsequently discharged she was yesterday and I was asked to evaluate the patient.  She currently states she is pain-free denies headache neck pain states her throat is sore from the NG tube.  States she has coughing a little bit     Denies abdominal pain denies muscle or joint pain denies shortness of breath    No current facility-administered medications on file prior to encounter.     Current Outpatient Medications on File Prior to Encounter   Medication Sig    EScitalopram oxalate (LEXAPRO) 10 MG tablet Take 10 mg by mouth.    latanoprost 0.005 % ophthalmic solution Apply 1 drop to eye.    meclizine (ANTIVERT) 25 mg tablet Take 25 mg by mouth.       Review of patient's allergies indicates:   Allergen Reactions    Aspirin     Codeine     Loratadine        Past Medical History:   Diagnosis Date    Acid reflux     Colorectal cancer 20 years ago    Unspecified glaucoma      Past Surgical History:   Procedure Laterality Date    COLON SURGERY      COLOSTOMY      EYE SURGERY      HYSTERECTOMY       Family History    None       Tobacco Use    Smoking status: Former     Types: Cigarettes    Smokeless tobacco: Never   Substance and Sexual Activity    Alcohol use: Never    Drug use: Never    Sexual activity: Not on file     Review of  Systems   Gastrointestinal:  Positive for abdominal pain.     Objective:     Vital Signs (Most Recent):  Temp: 97.9 °F (36.6 °C) (12/24/23 0336)  Pulse: 93 (12/24/23 0604)  Resp: 18 (12/23/23 2317)  BP: (!) 120/52 (12/24/23 0604)  SpO2: 95 % (12/24/23 0604) Vital Signs (24h Range):  Temp:  [97.7 °F (36.5 °C)-97.9 °F (36.6 °C)] 97.9 °F (36.6 °C)  Pulse:  [67-93] 93  Resp:  [18] 18  SpO2:  [91 %-100 %] 95 %  BP: (110-156)/(42-67) 120/52     Weight: 65.3 kg (144 lb)  Body mass index is 22.55 kg/m².     Physical Exam  Constitutional:       Appearance: Normal appearance.   Cardiovascular:      Rate and Rhythm: Normal rate.   Pulmonary:      Effort: Pulmonary effort is normal.   Abdominal:      General: Abdomen is flat. There is no distension.      Palpations: Abdomen is soft. There is no mass.      Tenderness: There is no abdominal tenderness. There is no guarding or rebound.      Hernia: No hernia is present.      Comments: Left lower quadrant ostomy with stool present   Skin:     General: Skin is warm and dry.   Neurological:      General: No focal deficit present.      Mental Status: She is alert.   Psychiatric:         Mood and Affect: Mood normal.         Behavior: Behavior normal.         Thought Content: Thought content normal.         Judgment: Judgment normal.            I have reviewed all pertinent lab results within the past 24 hours.  CBC:   Recent Labs   Lab 12/23/23 2023   WBC 24.79*   RBC 5.19   HGB 16.2*   HCT 45.9      MCV 88.4   MCH 31.2*   MCHC 35.3     BMP:   Recent Labs   Lab 12/23/23 2023   *      K 3.6      CO2 21   BUN 16   CREATININE 0.93   CALCIUM 10.7*     Microbiology Results (last 7 days)       ** No results found for the last 168 hours. **            Significant Diagnostics:  I have reviewed all pertinent imaging results/findings within the past 24 hours.  CT: I have reviewed all pertinent results/findings within the past 24 hours and my personal findings are:   Small bowel will involved bowel loops liquid field without transition zone ostomy pros and without parastomal hernia gallbladder present vasculature kidneys without obvious abnormalities    Assessment/Plan:     Generalized abdominal pain  Bowel rest IV fluids assured electrolytes are normal    Bandemia      Antibiotics evaluate for etiology      VTE Risk Mitigation (From admission, onward)      None            Ana James MD  General Surgery  [facility] - Emergency Department

## 2023-12-25 PROCEDURE — 11000001 HC ACUTE MED/SURG PRIVATE ROOM

## 2023-12-25 PROCEDURE — 99222 1ST HOSP IP/OBS MODERATE 55: CPT | Mod: ,,, | Performed by: SURGERY

## 2023-12-25 PROCEDURE — 63600175 PHARM REV CODE 636 W HCPCS: Performed by: SURGERY

## 2023-12-25 PROCEDURE — 25000003 PHARM REV CODE 250: Performed by: SURGERY

## 2023-12-25 RX ORDER — MUPIROCIN 20 MG/G
OINTMENT TOPICAL 2 TIMES DAILY
Status: DISCONTINUED | OUTPATIENT
Start: 2023-12-25 | End: 2023-12-26 | Stop reason: HOSPADM

## 2023-12-25 RX ORDER — POLYETHYLENE GLYCOL 3350 17 G/17G
17 POWDER, FOR SOLUTION ORAL 2 TIMES DAILY
Status: DISCONTINUED | OUTPATIENT
Start: 2023-12-25 | End: 2023-12-26 | Stop reason: HOSPADM

## 2023-12-25 RX ADMIN — PIPERACILLIN AND TAZOBACTAM 4.5 G: 4; .5 INJECTION, POWDER, FOR SOLUTION INTRAVENOUS; PARENTERAL at 05:12

## 2023-12-25 RX ADMIN — POTASSIUM CHLORIDE AND SODIUM CHLORIDE: 900; 150 INJECTION, SOLUTION INTRAVENOUS at 02:12

## 2023-12-25 RX ADMIN — POTASSIUM CHLORIDE AND SODIUM CHLORIDE: 900; 150 INJECTION, SOLUTION INTRAVENOUS at 09:12

## 2023-12-25 RX ADMIN — POLYETHYLENE GLYCOL 3350 17 G: 17 POWDER, FOR SOLUTION ORAL at 09:12

## 2023-12-25 RX ADMIN — POTASSIUM CHLORIDE AND SODIUM CHLORIDE: 900; 150 INJECTION, SOLUTION INTRAVENOUS at 04:12

## 2023-12-25 RX ADMIN — MORPHINE SULFATE 3 MG: 4 INJECTION, SOLUTION INTRAMUSCULAR; INTRAVENOUS at 10:12

## 2023-12-25 RX ADMIN — METOCLOPRAMIDE 10 MG: 5 INJECTION, SOLUTION INTRAMUSCULAR; INTRAVENOUS at 01:12

## 2023-12-25 RX ADMIN — ENOXAPARIN SODIUM 40 MG: 40 INJECTION SUBCUTANEOUS at 06:12

## 2023-12-25 RX ADMIN — PIPERACILLIN AND TAZOBACTAM 4.5 G: 4; .5 INJECTION, POWDER, FOR SOLUTION INTRAVENOUS; PARENTERAL at 09:12

## 2023-12-25 RX ADMIN — MUPIROCIN: 20 OINTMENT TOPICAL at 09:12

## 2023-12-25 RX ADMIN — PIPERACILLIN AND TAZOBACTAM 4.5 G: 4; .5 INJECTION, POWDER, FOR SOLUTION INTRAVENOUS; PARENTERAL at 02:12

## 2023-12-25 RX ADMIN — METOCLOPRAMIDE 10 MG: 5 INJECTION, SOLUTION INTRAMUSCULAR; INTRAVENOUS at 09:12

## 2023-12-25 RX ADMIN — METOCLOPRAMIDE 10 MG: 5 INJECTION, SOLUTION INTRAMUSCULAR; INTRAVENOUS at 05:12

## 2023-12-25 RX ADMIN — MORPHINE SULFATE 3 MG: 4 INJECTION, SOLUTION INTRAMUSCULAR; INTRAVENOUS at 01:12

## 2023-12-25 NOTE — PROGRESS NOTES
[facility] - [facility]  General Surgery  Progress Note    Subjective:     History of Present Illness:  Patient is status crampy abdominal pain no nausea vomiting states her colostomy has been working    Previous admission 12/11/2023 diagnosed with a UTI transferred to [facility] was treated conservatively for concern for bowel obstruction apparently symptoms resolved and subsequently discharged she was yesterday and I was asked to evaluate the patient.  She currently states she is pain-free denies headache neck pain states her throat is sore from the NG tube.  States she has coughing a little bit     Denies abdominal pain denies muscle or joint pain denies shortness of breath    Post-Op Info:  * No surgery found *         Interval History: better    Medications:  Continuous Infusions:   0/9% NACL & POTASSIUM CHLORIDE 20 MEQ/L 125 mL/hr at 12/25/23 0453     Scheduled Meds:   enoxparin  40 mg Subcutaneous Q24H (prophylaxis, 1700)    metoclopramide  10 mg Intravenous Q8H    piperacillin-tazobactam (Zosyn) IV (PEDS and ADULTS) (extended infusion is not appropriate)  4.5 g Intravenous Q8H     PRN Meds:morphine     Review of patient's allergies indicates:   Allergen Reactions    Aspirin     Codeine     Loratadine      Objective:     Vital Signs (Most Recent):  Temp: 97.9 °F (36.6 °C) (12/25/23 1041)  Pulse: 73 (12/25/23 1041)  Resp: 18 (12/25/23 1041)  BP: (!) 157/63 (12/25/23 1041)  SpO2: 95 % (12/25/23 1041) Vital Signs (24h Range):  Temp:  [97.9 °F (36.6 °C)-98.6 °F (37 °C)] 97.9 °F (36.6 °C)  Pulse:  [66-88] 73  Resp:  [18-20] 18  SpO2:  [93 %-96 %] 95 %  BP: (132-167)/(54-86) 157/63     Weight: 65.3 kg (144 lb)  Body mass index is 22.55 kg/m².    Intake/Output - Last 3 Shifts         12/23 0700 12/24 0659 12/24 0700 12/25 0659 12/25 0700 12/26 0659    I.V. (mL/kg)  733.3 (11.2)     IV Piggyback  100     Total Intake(mL/kg)  833.3 (12.8)     Urine (mL/kg/hr) 500 1200 (0.8)     Drains  400 100     Total Output 900 1300     Net -900 -466.7                     Physical Exam  Constitutional:       Appearance: Normal appearance.   Cardiovascular:      Rate and Rhythm: Normal rate.   Abdominal:      General: Abdomen is flat. There is no distension.      Palpations: Abdomen is soft.      Tenderness: There is no abdominal tenderness.      Comments: Stoma functioning   Neurological:      Mental Status: She is alert.          Significant Labs:  I have reviewed all pertinent lab results within the past 24 hours.  CBC:   Recent Labs   Lab 12/23/23 2023   WBC 24.79*   RBC 5.19   HGB 16.2*   HCT 45.9      MCV 88.4   MCH 31.2*   MCHC 35.3     BMP:   Recent Labs   Lab 12/23/23 2023   *      K 3.6      CO2 21   BUN 16   CREATININE 0.93   CALCIUM 10.7*       Significant Diagnostics:  I have reviewed all pertinent imaging results/findings within the past 24 hours.  Assessment/Plan:     Generalized abdominal pain  Bowel rest IV fluids assured electrolytes are normal    12/25  Better  D/c ngt  Cl diet      Bandemia      Antibiotics evaluate for etiology        Ana James MD  General Surgery  [facility] - [address] [place] [address]

## 2023-12-25 NOTE — PLAN OF CARE
Problem: Infection  Goal: Absence of Infection Signs and Symptoms  Outcome: Ongoing, Progressing     Problem: Adult Inpatient Plan of Care  Goal: Plan of Care Review  Outcome: Ongoing, Progressing     Problem: Adult Inpatient Plan of Care  Goal: Patient-Specific Goal (Individualized)  Outcome: Ongoing, Progressing

## 2023-12-25 NOTE — SUBJECTIVE & OBJECTIVE
Interval History: better    Medications:  Continuous Infusions:   0/9% NACL & POTASSIUM CHLORIDE 20 MEQ/L 125 mL/hr at 12/25/23 0453     Scheduled Meds:   enoxparin  40 mg Subcutaneous Q24H (prophylaxis, 1700)    metoclopramide  10 mg Intravenous Q8H    piperacillin-tazobactam (Zosyn) IV (PEDS and ADULTS) (extended infusion is not appropriate)  4.5 g Intravenous Q8H     PRN Meds:morphine     Review of patient's allergies indicates:   Allergen Reactions    Aspirin     Codeine     Loratadine      Objective:     Vital Signs (Most Recent):  Temp: 97.9 °F (36.6 °C) (12/25/23 1041)  Pulse: 73 (12/25/23 1041)  Resp: 18 (12/25/23 1041)  BP: (!) 157/63 (12/25/23 1041)  SpO2: 95 % (12/25/23 1041) Vital Signs (24h Range):  Temp:  [97.9 °F (36.6 °C)-98.6 °F (37 °C)] 97.9 °F (36.6 °C)  Pulse:  [66-88] 73  Resp:  [18-20] 18  SpO2:  [93 %-96 %] 95 %  BP: (132-167)/(54-86) 157/63     Weight: 65.3 kg (144 lb)  Body mass index is 22.55 kg/m².    Intake/Output - Last 3 Shifts         12/23 0700  12/24 0659 12/24 0700  12/25 0659 12/25 0700  12/26 0659    I.V. (mL/kg)  733.3 (11.2)     IV Piggyback  100     Total Intake(mL/kg)  833.3 (12.8)     Urine (mL/kg/hr) 500 1200 (0.8)     Drains 400 100     Total Output 900 1300     Net -900 -466.7                     Physical Exam  Constitutional:       Appearance: Normal appearance.   Cardiovascular:      Rate and Rhythm: Normal rate.   Abdominal:      General: Abdomen is flat. There is no distension.      Palpations: Abdomen is soft.      Tenderness: There is no abdominal tenderness.      Comments: Stoma functioning   Neurological:      Mental Status: She is alert.          Significant Labs:  I have reviewed all pertinent lab results within the past 24 hours.  CBC:   Recent Labs   Lab 12/23/23 2023   WBC 24.79*   RBC 5.19   HGB 16.2*   HCT 45.9      MCV 88.4   MCH 31.2*   MCHC 35.3     BMP:   Recent Labs   Lab 12/23/23 2023   *      K 3.6      CO2 21   BUN 16    CREATININE 0.93   CALCIUM 10.7*       Significant Diagnostics:  I have reviewed all pertinent imaging results/findings within the past 24 hours.

## 2023-12-25 NOTE — HOSPITAL COURSE
No complaints  Feels better  Stoma functioning    12/26/2023  Patient tolerating diet we will vascular regular   She desires to go home she is pain-free  We will check a white count make sure her leukocytosis is resolving  Urinalysis did not reflex to culture  Abdominal x-ray has improved since admission

## 2023-12-26 VITALS
SYSTOLIC BLOOD PRESSURE: 159 MMHG | RESPIRATION RATE: 18 BRPM | TEMPERATURE: 98 F | BODY MASS INDEX: 22.6 KG/M2 | OXYGEN SATURATION: 94 % | HEART RATE: 69 BPM | WEIGHT: 144 LBS | DIASTOLIC BLOOD PRESSURE: 57 MMHG | HEIGHT: 67 IN

## 2023-12-26 PROBLEM — R10.84 GENERALIZED ABDOMINAL PAIN: Status: RESOLVED | Noted: 2023-12-24 | Resolved: 2023-12-26

## 2023-12-26 PROBLEM — D72.825 BANDEMIA: Status: RESOLVED | Noted: 2023-12-24 | Resolved: 2023-12-26

## 2023-12-26 LAB
BASOPHILS # BLD AUTO: 0.02 K/UL (ref 0–0.2)
BASOPHILS NFR BLD AUTO: 0.3 % (ref 0–1)
DIFFERENTIAL METHOD BLD: ABNORMAL
EOSINOPHIL # BLD AUTO: 0.22 K/UL (ref 0–0.5)
EOSINOPHIL NFR BLD AUTO: 3.6 % (ref 1–4)
ERYTHROCYTE [DISTWIDTH] IN BLOOD BY AUTOMATED COUNT: 12.5 % (ref 11.5–14.5)
HCT VFR BLD AUTO: 35.5 % (ref 38–47)
HGB BLD-MCNC: 12.2 G/DL (ref 12–16)
IMM GRANULOCYTES # BLD AUTO: 0.01 K/UL (ref 0–0.04)
IMM GRANULOCYTES NFR BLD: 0.2 % (ref 0–0.4)
LYMPHOCYTES # BLD AUTO: 1.36 K/UL (ref 1–4.8)
LYMPHOCYTES NFR BLD AUTO: 22.1 % (ref 27–41)
MCH RBC QN AUTO: 31.2 PG (ref 27–31)
MCHC RBC AUTO-ENTMCNC: 34.4 G/DL (ref 32–36)
MCV RBC AUTO: 90.8 FL (ref 80–96)
MONOCYTES # BLD AUTO: 0.44 K/UL (ref 0–0.8)
MONOCYTES NFR BLD AUTO: 7.2 % (ref 2–6)
MPC BLD CALC-MCNC: 9.6 FL (ref 9.4–12.4)
NEUTROPHILS # BLD AUTO: 4.1 K/UL (ref 1.8–7.7)
NEUTROPHILS NFR BLD AUTO: 66.6 % (ref 53–65)
NRBC # BLD AUTO: 0 X10E3/UL
NRBC, AUTO (.00): 0 %
PLATELET # BLD AUTO: 221 K/UL (ref 150–400)
RBC # BLD AUTO: 3.91 M/UL (ref 4.2–5.4)
WBC # BLD AUTO: 6.15 K/UL (ref 4.5–11)

## 2023-12-26 PROCEDURE — 85025 COMPLETE CBC W/AUTO DIFF WBC: CPT | Performed by: SURGERY

## 2023-12-26 PROCEDURE — 25000003 PHARM REV CODE 250: Performed by: SURGERY

## 2023-12-26 PROCEDURE — 63600175 PHARM REV CODE 636 W HCPCS: Performed by: SURGERY

## 2023-12-26 PROCEDURE — 99238 HOSP IP/OBS DSCHRG MGMT 30/<: CPT | Mod: ,,, | Performed by: SURGERY

## 2023-12-26 RX ORDER — SODIUM CHLORIDE 9 MG/ML
INJECTION, SOLUTION INTRAVENOUS CONTINUOUS
Status: DISCONTINUED | OUTPATIENT
Start: 2023-12-26 | End: 2023-12-26 | Stop reason: HOSPADM

## 2023-12-26 RX ADMIN — METOCLOPRAMIDE 10 MG: 5 INJECTION, SOLUTION INTRAMUSCULAR; INTRAVENOUS at 05:12

## 2023-12-26 RX ADMIN — MUPIROCIN: 20 OINTMENT TOPICAL at 10:12

## 2023-12-26 RX ADMIN — POLYETHYLENE GLYCOL 3350 17 G: 17 POWDER, FOR SOLUTION ORAL at 08:12

## 2023-12-26 RX ADMIN — POTASSIUM CHLORIDE AND SODIUM CHLORIDE: 900; 150 INJECTION, SOLUTION INTRAVENOUS at 09:12

## 2023-12-26 RX ADMIN — PIPERACILLIN AND TAZOBACTAM 4.5 G: 4; .5 INJECTION, POWDER, FOR SOLUTION INTRAVENOUS; PARENTERAL at 05:12

## 2023-12-26 NOTE — PLAN OF CARE
[facility]  Initial Discharge Assessment       Primary Care Provider: Mara Jo MD    Admission Diagnosis: Small bowel obstruction [K56.609]  Generalized abdominal pain [R10.84]    Admission Date: 12/23/2023  Expected Discharge Date: 12/26/2023    Transition of Care Barriers: None    Payor: MEDICARE / Plan: MEDICARE PART A & B / Product Type: Government /     Extended Emergency Contact Information  Primary Emergency Contact: stanton booker  Mobile Phone: [phone]  Relation: Son   needed? No    Discharge Plan A: Home  Discharge Plan B: Home      [facility] - [place], [place] - [address] [facility] [address] [facility]  [address] [facility] [place] [address]  Phone: [phone] Fax: [phone]    [facility] [address] [place]  [address] [facility] [address]  Phone: [phone] Fax: [phone]      Initial Assessment (most recent)       Adult Discharge Assessment - 12/26/23 1453          Discharge Assessment    Assessment Type Discharge Planning Assessment     Confirmed/corrected address, phone number and insurance Yes     Confirmed Demographics Correct on Facesheet     Source of Information patient     If unable to respond/provide information was family/caregiver contacted? Yes     Contact Name/Number Stanton [phone]     Communicated ORI with patient/caregiver Date not available/Unable to determine;Yes     People in Home alone     Do you expect to return to your current living situation? Yes     Do you have help at home or someone to help you manage your care at home? Yes     Who are your caregiver(s) and their phone number(s)? family     Prior to hospitilization cognitive status: Unable to Assess     Current cognitive status: Unable to Assess     Walking or Climbing Stairs Difficulty no     Home Layout Able to live on 1st floor     Equipment Currently Used at Home none     Readmission within 30 days? No     Patient currently being  followed by outpatient case management? No     Do you currently have service(s) that help you manage your care at home? No     Do you take prescription medications? Yes     Do you have prescription coverage? Yes     Do you have any problems affording any of your prescribed medications? No     Is the patient taking medications as prescribed? yes     How do you get to doctors appointments? family or friend will provide     Are you on dialysis? No     Do you take coumadin? No     Discharge Plan A Home     Discharge Plan B Home     DME Needed Upon Discharge  none     Discharge Plan discussed with: Adult children     Transition of Care Barriers None        Physical Activity    On average, how many days per week do you engage in moderate to strenuous exercise (like a brisk walk)? 3 days     On average, how many minutes do you engage in exercise at this level? 30 min        Financial Resource Strain    How hard is it for you to pay for the very basics like food, housing, medical care, and heating? Not hard at all        Housing Stability    In the last 12 months, was there a time when you were not able to pay the mortgage or rent on time? No     In the last 12 months, how many places have you lived? 1     In the last 12 months, was there a time when you did not have a steady place to sleep or slept in a shelter (including now)? No        Transportation Needs    In the past 12 months, has lack of transportation kept you from medical appointments or from getting medications? No     In the past 12 months, has lack of transportation kept you from meetings, work, or from getting things needed for daily living? No        Food Insecurity    Within the past 12 months, you worried that your food would run out before you got the money to buy more. Never true     Within the past 12 months, the food you bought just didn't last and you didn't have money to get more. Never true        Stress    Do you feel stress - tense, restless,  nervous, or anxious, or unable to sleep at night because your mind is troubled all the time - these days? Not at all        Social Connections    In a typical week, how many times do you talk on the phone with family, friends, or neighbors? More than three times a week     How often do you get together with friends or relatives? More than three times a week     How often do you attend Religion or Nondenominational services? More than 4 times per year     Do you belong to any clubs or organizations such as Religion groups, unions, fraternal or athletic groups, or school groups? Yes     How often do you attend meetings of the clubs or organizations you belong to? More than 4 times per year                   Spoke with pt in room. Pt lives home alone, plans to return at [place]. Reviewed chart, 0 dc needs noted. Will follow consults.

## 2023-12-26 NOTE — PLAN OF CARE
Problem: Infection  Goal: Absence of Infection Signs and Symptoms  Outcome: Met     Problem: Adult Inpatient Plan of Care  Goal: Plan of Care Review  Outcome: Met  Goal: Patient-Specific Goal (Individualized)  Outcome: Met  Goal: Absence of Hospital-Acquired Illness or Injury  Outcome: Met  Goal: Optimal Comfort and Wellbeing  Outcome: Met  Goal: Readiness for Transition of Care  Outcome: Met     Problem: Bariatric Environmental Safety  Goal: Safety Maintained with Care  Outcome: Met

## 2023-12-26 NOTE — ASSESSMENT & PLAN NOTE
Bowel rest IV fluids assured electrolytes are normal    12/25  Better  D/c ngt  Cl diet poor     12/26/2023   If Cbc improved, my plan is to go home resume normal medications

## 2023-12-27 ENCOUNTER — PATIENT OUTREACH (OUTPATIENT)
Dept: ADMINISTRATIVE | Facility: CLINIC | Age: 83
End: 2023-12-27

## 2023-12-27 PROBLEM — R10.10 PAIN OF UPPER ABDOMEN: Status: ACTIVE | Noted: 2023-12-27

## 2023-12-27 NOTE — PROGRESS NOTES
C3 nurse attempted to contact Yaritza Jiang  for a TCC post hospital discharge follow up call. Patient was unable to take the call at this time and requested call back later.   The patient does not have a scheduled HOSFU appointment, and the pt does not have an [facility] PCP.

## 2023-12-27 NOTE — PLAN OF CARE
[facility] - [facility] Tele[facility]  Discharge Final Note    Primary Care Provider: Mara Jo MD    Expected Discharge Date: 12/26/2023    Final Discharge Note (most recent)       Final Note - 12/27/23 1113          Final Note    Assessment Type Final Discharge Note     Anticipated Discharge Disposition Home or Self Care        Post-Acute Status    Discharge Delays None known at this time                     Important Message from Medicare  Important Message from Medicare regarding Discharge Appeal Rights: Given to patient/caregiver, Explained to patient/caregiver, Signed/date by patient/caregiver     Date IMM was signed: 12/26/23  Time IMM was signed: 1000    Contact Info       Mara Jo MD   Specialty: Family Medicine   Relationship: PCP - General    [address]  [facility] [place] [address]   Phone: [phone]       Next Steps: Follow up          Pt dc 0 dc needs

## 2023-12-27 NOTE — PHYSICIAN QUERY
PT Name: Yaritza Jiang  MR #: [id number]     DOCUMENTATION CLARIFICATION     CDS:  Alissa IZQUIERDO, RN   Contact information:  [email or web address]  Query withdrawn, see note   ME RN     This form is a permanent document in the medical record.     Query Date: December 27, 2023    By submitting this query, we are merely seeking further clarification of documentation to reflect the severity of illness of your patient. Please utilize your independent clinical judgment when addressing the question(s) below.    The medical record reflects the following:     Indicators   Supporting Clinical Findings Location in Medical Record   x Bowel obstruction, intestinal obstruction, LBO or SBO documented 83-year-old female patient with abdominal pain and vomiting and back pain that started earlier today.  The patient has history of colostomy on the left side for previous colon cancer.   Final diagnoses:  Small bowel obstruction ED PN 12/23/23   x Radiology findings No acute cardiopulmonary process demonstrated.  Scattered fluid within the small bowel where there is mild distension with differential including sequela of enteritis as well as mild partial small bowel obstruction.    Left abdominal ostomy again noted.  There is fluid throughout much of the stomach and small bowel. There is mild dilatation of portions of small bowel within the left abdomen measuring up to 3.9 cm. Findings may reflect sequela of gastroenteritis or mild partial small bowel obstruction. Recommend surgical consultation.    No acute radiographic abnormality in the abdomen.  Esophagogastric tube one clear within the mid stomach, similar to prior.    Abd Xray 12/23/23        CT Abd 12/23/23              KUB 12/25/23    Treatment/Medication      Procedure/Surgery     x Other Generalized abdominal pain  Bowel rest IV fluids assured electrolytes are normal     Better  D/c ngt  Cl diet   H&P 12/24/23      PN 12/25/23     Provider, please further specify  the bowel obstruction diagnosis:  [    ] Partial or incomplete intestinal obstruction, due to adhesions   [    ] Partial or incomplete intestinal obstruction, due to other (please specify): ____________   [    ] Partial or incomplete intestinal obstruction, unknown or unspecified etiology   [    ] Complete intestinal obstruction, due to adhesions   [    ] Complete intestinal obstruction, due to other (please specify): ____________   [    ] Complete intestinal obstruction, unknown or unspecified etiology   [    ] Other intestinal condition (please specify): _____________________   [    ] Intestinal Obstruction Ruled Out   [   ]  Clinically Undetermined       Please document in your progress notes daily for the duration of treatment until resolved, and include in your discharge summary.

## 2023-12-28 PROBLEM — R10.10 PAIN OF UPPER ABDOMEN: Status: RESOLVED | Noted: 2023-12-27 | Resolved: 2023-12-28

## 2023-12-28 NOTE — DISCHARGE SUMMARY
[facility] - [address] [place] [address]  General Surgery  Discharge Summary      Patient Name: Yaritza Jiang  MRN: [id number]  Admission Date: 12/23/2023  Hospital Length of Stay: 2 days  Discharge Date and Time: 12/26/2023  3:34 PM  Attending Physician: No att. providers found   Discharging Provider: Ana James MD  Primary Care Provider: Mara Jo MD    HPI:   Patient is status crampy abdominal pain no nausea vomiting states her colostomy has been working    Previous admission 12/11/2023 diagnosed with a UTI transferred to [facility] was treated conservatively for concern for bowel obstruction apparently symptoms resolved and subsequently discharged she was yesterday and I was asked to evaluate the patient.  She currently states she is pain-free denies headache neck pain states her throat is sore from the NG tube.  States she has coughing a little bit     Denies abdominal pain denies muscle or joint pain denies shortness of breath    * No surgery found *      Indwelling Lines/Drains at time of discharge:   Lines/Drains/Airways       Drain  Duration                  Colostomy LLQ -- days                  Hospital Course: No complaints  Feels better  Stoma functioning    12/26/2023  Patient tolerating diet we will vascular regular   She desires to go home she is pain-free  We will check a white count make sure her leukocytosis is resolving  Urinalysis did not reflex to culture  Abdominal x-ray has improved since admission    Goals of Care Treatment Preferences:  Code Status: Full Code      Consults:     Significant Diagnostic Studies: N/A    Pending Diagnostic Studies:       None          Final Active Diagnoses:      Problems Resolved During this Admission:    Diagnosis Date Noted Date Resolved POA    PRINCIPAL PROBLEM:  Generalized abdominal pain [R10.84] 12/24/2023 12/26/2023 Yes    Pain of upper abdomen [R10.10] 12/27/2023 12/28/2023 Yes    Bandemia [D72.825] 12/24/2023 12/26/2023 Yes       Discharged Condition: good    Disposition: Home or Self Care    Follow Up:   Follow-up Information       Mara Jo MD Follow up.    Specialty: Family Medicine  Contact information:  [address]  [facility]   [place] [address] [phone]                           Patient Instructions:      Diet general     Medications:  Reconciled Home Medications:      Medication List        CONTINUE taking these medications      EScitalopram oxalate 10 MG tablet  Commonly known as: LEXAPRO  Take 10 mg by mouth.     latanoprost 0.005 % ophthalmic solution  Apply 1 drop to eye.     meclizine 25 mg tablet  Commonly known as: ANTIVERT  Take 25 mg by mouth.            Time spent on the discharge of patient: 15 minutes    Ana James MD  General Surgery  [facility] - [address] [place] [address]

## 2023-12-28 NOTE — DISCHARGE SUMMARY
[facility] - [address] [place] [address]  General Surgery  Discharge Summary      Patient Name: Yaritza Jiang  MRN: [id number]  Admission Date: 12/23/2023  Hospital Length of Stay: 2 days  Discharge Date and Time: 12/26/2023  3:34 PM  Attending Physician: No att. providers found   Discharging Provider: Ana James MD  Primary Care Provider: Mara Jo MD    HPI:   Patient is status crampy abdominal pain no nausea vomiting states her colostomy has been working    Previous admission 12/11/2023 diagnosed with a UTI transferred to [facility] was treated conservatively for concern for bowel obstruction apparently symptoms resolved and subsequently discharged she was yesterday and I was asked to evaluate the patient.  She currently states she is pain-free denies headache neck pain states her throat is sore from the NG tube.  States she has coughing a little bit     Denies abdominal pain denies muscle or joint pain denies shortness of breath    * No surgery found *      Indwelling Lines/Drains at time of discharge:   Lines/Drains/Airways       Drain  Duration                  Colostomy LLQ -- days                  Hospital Course: No complaints  Feels better  Stoma functioning    12/26/2023  Patient tolerating diet we will vascular regular   She desires to go home she is pain-free  We will check a white count make sure her leukocytosis is resolving  Urinalysis did not reflex to culture  Abdominal x-ray has improved since admission    Goals of Care Treatment Preferences:  Code Status: Full Code      Consults:     Significant Diagnostic Studies: Labs: CBC   Recent Labs   Lab 12/26/23  1142   WBC 6.15   HGB 12.2   HCT 35.5*          Pending Diagnostic Studies:       None          Final Active Diagnoses:      Problems Resolved During this Admission:    Diagnosis Date Noted Date Resolved POA    PRINCIPAL PROBLEM:  Generalized abdominal pain [R10.84] 12/24/2023 12/26/2023 Yes    Pain of upper abdomen  [R10.10] 12/27/2023 12/28/2023 Yes    Bandemia [D72.825] 12/24/2023 12/26/2023 Yes      Discharged Condition: good    Disposition: Home or Self Care    Follow Up:   Follow-up Information       Mara Jo MD Follow up.    Specialty: Family Medicine  Contact information:  [address]  [facility]   [place] [address] [phone]                           Patient Instructions:      Diet general     Medications:  Reconciled Home Medications:      Medication List        CONTINUE taking these medications      EScitalopram oxalate 10 MG tablet  Commonly known as: LEXAPRO  Take 10 mg by mouth.     latanoprost 0.005 % ophthalmic solution  Apply 1 drop to eye.     meclizine 25 mg tablet  Commonly known as: ANTIVERT  Take 25 mg by mouth.            Time spent on the discharge of patient: 15 minutes    Ana James MD  General Surgery  [facility] - [address] [place] [address]

## 2023-12-28 NOTE — PROGRESS NOTES
"During the call the patient became very agitated and states she doesn't want to be bother.   I ask the patient if she wanted to continue the call and she yells "NO".   I thank the patient for taking time to speak with me and ended the call.     "

## 2023-12-29 NOTE — PHYSICIAN QUERY
PT Name: Yaritza Jiang  MR #: [id number]     DOCUMENTATION CLARIFICATION      CDS:  Alissa IZQUIERDO, RN   Contact information:  [email or web address]  This form is a permanent document in the medical record.     Query Date: December 29, 2023    By submitting this query, we are merely seeking further clarification of documentation.  Please utilize your independent clinical judgment when addressing the question(s) below.     The Medical Record contains the following:    Clinical Information Location in Medical Record   Patient is an 84 y/o female that presents to the ED via EMS with Abdominal Pain. Patient states she has been vomiting since 16:30, with back pain and stomach pain.     WBC 24.79    Clinical Impression:  Final diagnoses:  Small bowel obstruction (Primary)      ED PN 12/23/23   No acute cardiopulmonary process demonstrated.  Scattered fluid within the small bowel where there is mild distension with differential including sequela of enteritis as well as mild partial small bowel obstruction.      Left abdominal ostomy again noted.  There is fluid throughout much of the stomach and small bowel. There is mild dilatation of portions of small bowel within the left abdomen measuring up to 3.9 cm. Findings may reflect sequela of gastroenteritis or mild partial small bowel obstruction. Recommend surgical consultation.     No acute radiographic abnormality in the abdomen.  Esophagogastric tube one clear within the mid stomach, similar to prior.    Xray Abd 12/23/23          CT abd pelvis 12/23/23        KUB 12/24/23   Patient is status crampy abdominal pain no nausea vomiting states her colostomy has been working   Assessment/Plan:  Generalized abdominal pain  Bowel rest IV fluids assured electrolytes are normal   H&P 12/24/23   Better  D/c ngt  Cl diet PN 12/25/23   Feels better  Stoma functioning  Patient tolerating diet we will vascular regular   She desires to go home she is pain-free  We will check a white  count make sure her leukocytosis is resolving  Urinalysis did not reflex to culture  Abdominal x-ray has improved since admission DCS 12/26/23     Please document your best medical opinion regarding the etiology of Abdominal pain ?       [     ] Partial Small Bowel Obstruction   [     ] Enteritis   [     ] Other etiology (please specify):___________________   [ x ] Clinically Undetermined     Please document in your progress notes daily for the duration of treatment, until resolved, and include in your discharge summary.

## 2024-03-24 ENCOUNTER — HOSPITAL ENCOUNTER (EMERGENCY)
Facility: HOSPITAL | Age: 84
Discharge: HOME | End: 2024-03-24
Attending: INTERNAL MEDICINE
Payer: MEDICARE

## 2024-03-24 VITALS
DIASTOLIC BLOOD PRESSURE: 74 MMHG | RESPIRATION RATE: 16 BRPM | HEIGHT: 67 IN | HEART RATE: 70 BPM | OXYGEN SATURATION: 96 % | WEIGHT: 135 LBS | TEMPERATURE: 98 F | BODY MASS INDEX: 21.19 KG/M2 | SYSTOLIC BLOOD PRESSURE: 179 MMHG

## 2024-03-24 DIAGNOSIS — S91.211A LACERATION OF RIGHT GREAT TOE WITHOUT FOREIGN BODY WITH DAMAGE TO NAIL, INITIAL ENCOUNTER: Primary | ICD-10-CM

## 2024-03-24 DIAGNOSIS — S68.119A AMPUTATION FINGER: ICD-10-CM

## 2024-03-24 PROCEDURE — 25000003 PHARM REV CODE 250: Performed by: INTERNAL MEDICINE

## 2024-03-24 PROCEDURE — 99284 EMERGENCY DEPT VISIT MOD MDM: CPT | Mod: 25 | Performed by: INTERNAL MEDICINE

## 2024-03-24 PROCEDURE — 90715 TDAP VACCINE 7 YRS/> IM: CPT | Performed by: INTERNAL MEDICINE

## 2024-03-24 PROCEDURE — 12004 RPR S/N/AX/GEN/TRK7.6-12.5CM: CPT | Performed by: INTERNAL MEDICINE

## 2024-03-24 PROCEDURE — 63600175 PHARM REV CODE 636 W HCPCS: Performed by: INTERNAL MEDICINE

## 2024-03-24 PROCEDURE — 12004 RPR S/N/AX/GEN/TRK7.6-12.5CM: CPT

## 2024-03-24 PROCEDURE — 99284 EMERGENCY DEPT VISIT MOD MDM: CPT | Mod: 25

## 2024-03-24 PROCEDURE — 90471 IMMUNIZATION ADMIN: CPT | Performed by: INTERNAL MEDICINE

## 2024-03-24 RX ORDER — POLYETHYLENE GLYCOL 3350 17 G/17G
17 POWDER, FOR SOLUTION ORAL DAILY PRN
COMMUNITY

## 2024-03-24 RX ORDER — LIDOCAINE HYDROCHLORIDE 10 MG/ML
10 INJECTION, SOLUTION EPIDURAL; INFILTRATION; INTRACAUDAL; PERINEURAL
Status: COMPLETED | OUTPATIENT
Start: 2024-03-24 | End: 2024-03-24

## 2024-03-24 RX ORDER — METOPROLOL SUCCINATE 50 MG/1
1 TABLET, EXTENDED RELEASE ORAL DAILY
COMMUNITY

## 2024-03-24 RX ORDER — METHYLPREDNISOLONE ACETATE 40 MG/ML
INJECTION, SUSPENSION INTRA-ARTICULAR; INTRALESIONAL; INTRAMUSCULAR; SOFT TISSUE
COMMUNITY
Start: 2024-03-19 | End: 2024-04-09 | Stop reason: SDUPTHER

## 2024-03-24 RX ORDER — CEPHALEXIN 500 MG/1
500 CAPSULE ORAL EVERY 12 HOURS
Qty: 14 CAPSULE | Refills: 0 | Status: SHIPPED | OUTPATIENT
Start: 2024-03-24 | End: 2024-03-31

## 2024-03-24 RX ORDER — AMLODIPINE BESYLATE 2.5 MG/1
TABLET ORAL
COMMUNITY
Start: 2024-03-19

## 2024-03-24 RX ADMIN — TETANUS TOXOID, REDUCED DIPHTHERIA TOXOID AND ACELLULAR PERTUSSIS VACCINE, ADSORBED 0.5 ML: 5; 2.5; 8; 8; 2.5 SUSPENSION INTRAMUSCULAR at 02:03

## 2024-03-24 RX ADMIN — LIDOCAINE HYDROCHLORIDE 100 MG: 10 INJECTION, SOLUTION EPIDURAL; INFILTRATION; INTRACAUDAL; PERINEURAL at 02:03

## 2024-03-24 NOTE — ED NOTES
RIGHT GREAT TOE CLEANED WITH NS & CHLORHEXIDINE; DRIED; WRAPPED WITH STERILE GAUZE; WRAPPED WITH 1 INCH STRETCH BANDAGE; FINGER SPLINT APPLIED OVER TOE, ANOTHER FINGER SPLINT LEFT STRAIGHT & POSITIONED TO INSIDE OF FOOT ALONG GREAT TOE; WRAPPED WITH KERLIX & SECURED WITH ACE; TOLERATED WELL;

## 2024-03-24 NOTE — ED PROVIDER NOTES
Encounter Date: 3/24/2024       History     Chief Complaint   Patient presents with    Toe Injury     Right great toe     Patient said that she tripped and got her right great toe caught in a rug and fell causing a laceration of the toe.  She denies any other injuries but states she had bleeding from that area.  She comes in stating the toe is just hanging off by the skin.        Review of patient's allergies indicates:   Allergen Reactions    Aspirin Nausea And Vomiting    Codeine Nausea And Vomiting    Loratadine Nausea And Vomiting     Past Medical History:   Diagnosis Date    Acid reflux     Colorectal cancer 20 years ago    Unspecified glaucoma      Past Surgical History:   Procedure Laterality Date    COLON SURGERY      COLOSTOMY      EYE SURGERY      HYSTERECTOMY       History reviewed. No pertinent family history.  Social History     Tobacco Use    Smoking status: Former     Types: Cigarettes    Smokeless tobacco: Never   Substance Use Topics    Alcohol use: Never    Drug use: Never     Review of Systems   Constitutional:  Negative for fever.   HENT:  Negative for sore throat.    Respiratory:  Negative for shortness of breath.    Cardiovascular:  Negative for chest pain.   Gastrointestinal:  Negative for nausea.   Genitourinary:  Negative for dysuria.   Musculoskeletal:  Negative for back pain.   Skin:  Negative for rash.   Neurological:  Negative for weakness.   Hematological:  Does not bruise/bleed easily.       Physical Exam     Initial Vitals [03/24/24 1414]   BP Pulse Resp Temp SpO2   (!) 179/74 70 16 97.7 °F (36.5 °C) 96 %      MAP       --         Physical Exam    Vitals reviewed.  Constitutional: She appears well-developed.   Eyes: Pupils are equal, round, and reactive to light.   Neck:   Normal range of motion.  Cardiovascular:  Normal rate.           Pulmonary/Chest: Breath sounds normal.   Abdominal: Abdomen is soft.   Colostomy bag       Musculoskeletal:         General: Normal range of motion.       Cervical back: Normal range of motion.      Right foot: Laceration present.        Legs:         Feet:       Comments: Laceration dorsum right great toe below the nail bed with a matrix exposed.  8 cm laceration oval shaped.     Neurological: She is alert.   Skin: Skin is warm.   Psychiatric: She has a normal mood and affect.         Medical Screening Exam   See Full Note    ED Course   Lac Repair    Date/Time: 3/24/2024 3:02 PM    Performed by: Edilberto العراقي MD  Authorized by: Edilberto العراقي MD    Consent:     Consent obtained:  Verbal and emergent situation    Consent given by:  Patient    Risks discussed:  Pain  Universal protocol:     Procedure explained and questions answered to patient or proxy's satisfaction: yes      Patient identity confirmed:  Verbally with patient  Anesthesia:     Anesthesia method:  Local infiltration    Local anesthetic:  Lidocaine 1% w/o epi  Laceration details:     Location:  Toe    Toe location:  R big toe    Length (cm):  8  Pre-procedure details:     Preparation:  Patient was prepped and draped in usual sterile fashion  Exploration:     Limited defect created (wound extended): no      Hemostasis achieved with:  Direct pressure    Imaging outcome: foreign body not noted      Contaminated: no    Treatment:     Area cleansed with:  Povidone-iodine    Amount of cleaning:  Standard    Visualized foreign bodies/material removed: no      Debridement:  None    Scar revision: no    Skin repair:     Repair method:  Sutures    Suture size:  2-0    Suture material:  Nylon    Suture technique:  Simple interrupted    Number of sutures:  7  Approximation:     Approximation:  Close  Post-procedure details:     Dressing:  Antibiotic ointment    Procedure completion:  Tolerated well, no immediate complications    Labs Reviewed - No data to display       Imaging Results              X-Ray Foot Complete Right (Final result)  Result time 03/24/24 14:44:57      Final result by Gordon Hanna MD  (03/24/24 14:44:57)                   Impression:      Degenerative changes.  No acute finding.      Electronically signed by: Gordon Hanna  Date:    03/24/2024  Time:    14:44               Narrative:    EXAMINATION:  XR FOOT COMPLETE 3 VIEW RIGHT    CLINICAL HISTORY:  . Complete traumatic metacarpophalangeal amputation of unspecified finger, initial encounter    TECHNIQUE:  AP, lateral, and oblique views of the right foot were performed.    COMPARISON:  None    FINDINGS:  Degenerative changes are seen diffusely.  No acute fracture detected.  There are prominent degenerative changes of the great toe interphalangeal joint without fracture delineated on this exam.                                       Medications   neomycin-bacitracnZn-polymyxnB packet (has no administration in time range)   LIDOcaine (PF) 10 mg/ml (1%) injection 100 mg (100 mg Infiltration Given 3/24/24 1445)   Tdap (BOOSTRIX) vaccine injection 0.5 mL (0.5 mLs Intramuscular Given 3/24/24 1440)   LIDOcaine (PF) 10 mg/ml (1%) injection 100 mg (100 mg Infiltration Given 3/24/24 1452)     Medical Decision Making  Patient with complete amputation of the left great toe will get x-ray to rule out fracture.    Amount and/or Complexity of Data Reviewed  Radiology: ordered.  Discussion of management or test interpretation with external provider(s): Laceration closed with sutures, explained nail bed has been exposed and may not survive.  Patient understands she may lose her toenail.  Will put on antibiotics for completeness, x-ray shows no fracture or dislocation.  Return for 2 day wound check.  Neurovascular motor function normal postprocedure.    Risk  OTC drugs.  Prescription drug management.                                      Clinical Impression:   Final diagnoses:  [S68.119A] Amputation finger  [S91.211A] Laceration of right great toe without foreign body with damage to nail, initial encounter (Primary)        ED Disposition Condition    Discharge Stable           ED Prescriptions       Medication Sig Dispense Start Date End Date Auth. Provider    cephALEXin (KEFLEX) 500 MG capsule Take 1 capsule (500 mg total) by mouth every 12 (twelve) hours. for 7 days 14 capsule 3/24/2024 3/31/2024 Edilberto العراقي MD          Follow-up Information       Follow up With Specialties Details Why Contact Info    [facility] - Emergency Department Emergency Medicine In 2 days For wound re-check [address] [place] [id number] [phone]             Edilberto العراقي MD  03/24/24 0044

## 2024-03-24 NOTE — ED NOTES
7 SUTURES IN PLACE; TOE/FOOT CLEANED; TOE WRAPPED WITH 1 INCH STRETCHY GAUZE; WRAPPED WITH KERLIX; & SECURED WITH ACE

## 2024-03-26 ENCOUNTER — HOSPITAL ENCOUNTER (EMERGENCY)
Facility: HOSPITAL | Age: 84
Discharge: HOME | End: 2024-03-26
Attending: INTERNAL MEDICINE
Payer: COMMERCIAL

## 2024-03-26 VITALS
BODY MASS INDEX: 22.15 KG/M2 | DIASTOLIC BLOOD PRESSURE: 65 MMHG | HEIGHT: 67 IN | RESPIRATION RATE: 20 BRPM | TEMPERATURE: 98 F | SYSTOLIC BLOOD PRESSURE: 167 MMHG | OXYGEN SATURATION: 99 % | WEIGHT: 141.13 LBS | HEART RATE: 107 BPM

## 2024-03-26 DIAGNOSIS — Z51.89 ENCOUNTER FOR WOUND RE-CHECK: Primary | ICD-10-CM

## 2024-03-26 PROCEDURE — 99499 UNLISTED E&M SERVICE: CPT | Performed by: INTERNAL MEDICINE

## 2024-03-26 PROCEDURE — 25000003 PHARM REV CODE 250: Performed by: INTERNAL MEDICINE

## 2024-03-26 PROCEDURE — 99284 EMERGENCY DEPT VISIT MOD MDM: CPT | Performed by: INTERNAL MEDICINE

## 2024-03-26 PROCEDURE — 99283 EMERGENCY DEPT VISIT LOW MDM: CPT

## 2024-03-26 RX ADMIN — BACITRACIN ZINC, NEOMYCIN, POLYMYXIN B 1 EACH: 400; 3.5; 5 OINTMENT TOPICAL at 09:03

## 2024-03-26 NOTE — ED PROVIDER NOTES
Encounter Date: 3/26/2024       History     Chief Complaint   Patient presents with    Wound Check     In for wound check sutures rt toe     Patient 2 day wound check of right great toe laceration with nail bed root here for wound check.  Patient was placed on antibiotics but foot still red.        Review of patient's allergies indicates:   Allergen Reactions    Aspirin Nausea And Vomiting    Codeine Nausea And Vomiting    Loratadine Nausea And Vomiting     Past Medical History:   Diagnosis Date    Acid reflux     Colorectal cancer 20 years ago    Unspecified glaucoma      Past Surgical History:   Procedure Laterality Date    COLON SURGERY      COLOSTOMY      EYE SURGERY      HYSTERECTOMY       No family history on file.  Social History     Tobacco Use    Smoking status: Former     Types: Cigarettes    Smokeless tobacco: Never   Substance Use Topics    Alcohol use: Never    Drug use: Never     Review of Systems   Constitutional:  Negative for fever.   HENT:  Negative for sore throat.    Respiratory:  Negative for shortness of breath.    Cardiovascular:  Negative for chest pain.   Gastrointestinal:  Negative for nausea.   Genitourinary:  Negative for dysuria.   Musculoskeletal:  Negative for back pain.   Skin:  Negative for rash.   Neurological:  Negative for weakness.   Hematological:  Does not bruise/bleed easily.       Physical Exam     Initial Vitals [03/26/24 0918]   BP Pulse Resp Temp SpO2   (!) 167/65 107 20 98.1 °F (36.7 °C) 99 %      MAP       --         Physical Exam    Vitals reviewed.  Constitutional: She appears well-developed.   Eyes: Pupils are equal, round, and reactive to light.   Neck:   Normal range of motion.  Cardiovascular:  Normal rate.           Pulmonary/Chest: Breath sounds normal.   Abdominal: Abdomen is soft.   Musculoskeletal:         General: Normal range of motion.      Cervical back: Normal range of motion.      Right foot: Normal capillary refill. Swelling and tenderness present.  Normal pulse.     Neurological: She is alert.   Skin: Skin is warm.   Psychiatric: She has a normal mood and affect.         Medical Screening Exam   See Full Note    ED Course   Procedures  Labs Reviewed - No data to display       Imaging Results    None          Medications   neomycin-bacitracnZn-polymyxnB packet (has no administration in time range)     Medical Decision Making  Patient follow up today will check of laceration of the right great toe with the nail bed exposed.    Amount and/or Complexity of Data Reviewed  Discussion of management or test interpretation with external provider(s): Right great toe shows no drainage but there is a erythematous changes around the foot, advised her not to soak Epsom salt or get wet.  Elevate, continue antibiotics will do a 2 day wound    Risk  OTC drugs.                                      Clinical Impression:   Final diagnoses:  [Z51.89] Encounter for wound re-check (Primary)        ED Disposition Condition    Discharge Stable          ED Prescriptions    None       Follow-up Information       Follow up With Specialties Details Why Contact Info    [facility] - Emergency Department Emergency Medicine In 2 days  [address] [place] [id number] [phone]             Edilberto العراقي MD  03/26/24 4183

## 2024-03-27 NOTE — ADDENDUM NOTE
Encounter addended by: Terra Ram on: 3/27/2024 12:52 PM   Actions taken: SmartForm saved, Flowsheet accepted, Charge Capture section accepted

## 2024-03-28 ENCOUNTER — HOSPITAL ENCOUNTER (EMERGENCY)
Facility: HOSPITAL | Age: 84
Discharge: HOME | End: 2024-03-28
Attending: INTERNAL MEDICINE
Payer: MEDICARE

## 2024-03-28 VITALS
DIASTOLIC BLOOD PRESSURE: 69 MMHG | BODY MASS INDEX: 21.94 KG/M2 | HEIGHT: 67 IN | OXYGEN SATURATION: 98 % | TEMPERATURE: 97 F | HEART RATE: 50 BPM | SYSTOLIC BLOOD PRESSURE: 179 MMHG | RESPIRATION RATE: 15 BRPM | WEIGHT: 139.81 LBS

## 2024-03-28 DIAGNOSIS — Z51.89 ENCOUNTER FOR WOUND RE-CHECK: Primary | ICD-10-CM

## 2024-03-28 PROCEDURE — 99024 POSTOP FOLLOW-UP VISIT: CPT | Mod: ,,, | Performed by: INTERNAL MEDICINE

## 2024-03-28 PROCEDURE — 99281 EMR DPT VST MAYX REQ PHY/QHP: CPT

## 2024-03-28 NOTE — ED TRIAGE NOTES
PATIENT PRESENTED WITH FRIEND FOR SECOND WOUND CHECK; PER MD THERE IS DECREASE IN REDNESS; NO C/O; ENCOURAGED TO KEEP ELEVATED; NO C/O PAIN; PATIENT TO RETURN IN 14 DAYS FOR REMOVAL OF SUTURES

## 2024-03-28 NOTE — ED PROVIDER NOTES
Encounter Date: 3/28/2024       History     Chief Complaint   Patient presents with    Wound Check     PRESENT FOR 4 DAY WOUND CHECK OF THE RIGHT GREAT TOE LACERATION REPAIR.  Patient had some redness on the foot 2 days ago that is now completely resolved as well as any swelling.  There is some redness around the sutures but no evidence any drainage or infection.  Patient is on antibiotics.  States he feels much better        Review of patient's allergies indicates:   Allergen Reactions    Aspirin Nausea And Vomiting    Codeine Nausea And Vomiting    Loratadine Nausea And Vomiting     Past Medical History:   Diagnosis Date    Acid reflux     Colorectal cancer 20 years ago    Unspecified glaucoma      Past Surgical History:   Procedure Laterality Date    COLON SURGERY      COLOSTOMY      EYE SURGERY      HYSTERECTOMY       No family history on file.  Social History     Tobacco Use    Smoking status: Former     Types: Cigarettes    Smokeless tobacco: Never   Substance Use Topics    Alcohol use: Never    Drug use: Never     Review of Systems   Constitutional:  Negative for fever.   HENT:  Negative for sore throat.    Respiratory:  Negative for shortness of breath.    Cardiovascular:  Negative for chest pain.   Gastrointestinal:  Negative for nausea.   Genitourinary:  Negative for dysuria.   Musculoskeletal:  Negative for back pain.   Skin:  Negative for rash.   Neurological:  Negative for weakness.   Hematological:  Does not bruise/bleed easily.       Physical Exam     Initial Vitals   BP Pulse Resp Temp SpO2   -- -- -- -- --      MAP       --         Physical Exam    Vitals reviewed.  Constitutional: She appears well-developed.   Eyes: Pupils are equal, round, and reactive to light.   Neck:   Normal range of motion.  Cardiovascular:  Normal rate.           Pulmonary/Chest: Breath sounds normal.   Abdominal: Abdomen is soft.   Musculoskeletal:         General: Normal range of motion.      Cervical back: Normal range of  motion.      Right foot: Tenderness present.      Left foot: Normal.        Feet:       Comments: Redness around the right great toe but no drainage or discharge.  Sutures intact.  Neurovascular motor function normal     Neurological: She is alert.   Skin: Skin is warm.   Psychiatric: She has a normal mood and affect.         Medical Screening Exam   See Full Note    ED Course   Procedures  Labs Reviewed - No data to display       Imaging Results    None          Medications - No data to display  Medical Decision Making  Patient follows up for 4 day wound check for right toe laceration.    Amount and/or Complexity of Data Reviewed  Discussion of management or test interpretation with external provider(s): Wound appears to be healing much better now no evidence any infection, sutures in tach removal 14 days.                                      Clinical Impression:   Final diagnoses:  [Z51.89] Encounter for wound re-check (Primary)        ED Disposition Condition    Discharge Stable          ED Prescriptions    None       Follow-up Information       Follow up With Specialties Details Why Contact Info    [facility] - Emergency Department Emergency Medicine In 14 days For wound re-check [address] [place] [id number] [phone]             Edilberto العراقي MD  03/28/24 0984

## 2024-04-02 NOTE — ADDENDUM NOTE
Encounter addended by: Beth Castillo on: 4/2/2024 8:27 AM   Actions taken: Charge Capture section accepted

## 2024-04-03 NOTE — ADDENDUM NOTE
Encounter addended by: Terra Ram on: 4/3/2024 9:30 AM   Actions taken: SmartForm saved, Flowsheet accepted, Charge Capture section accepted

## 2024-04-09 ENCOUNTER — HOSPITAL ENCOUNTER (EMERGENCY)
Facility: HOSPITAL | Age: 84
Discharge: HOME | End: 2024-04-09
Attending: FAMILY MEDICINE
Payer: COMMERCIAL

## 2024-04-09 VITALS
HEIGHT: 67 IN | BODY MASS INDEX: 21.87 KG/M2 | SYSTOLIC BLOOD PRESSURE: 167 MMHG | WEIGHT: 139.31 LBS | RESPIRATION RATE: 18 BRPM | DIASTOLIC BLOOD PRESSURE: 76 MMHG | OXYGEN SATURATION: 95 % | HEART RATE: 69 BPM | TEMPERATURE: 98 F

## 2024-04-09 DIAGNOSIS — Z48.02 VISIT FOR SUTURE REMOVAL: Primary | ICD-10-CM

## 2024-04-09 DIAGNOSIS — I10 PRIMARY HYPERTENSION: ICD-10-CM

## 2024-04-09 PROCEDURE — 99281 EMR DPT VST MAYX REQ PHY/QHP: CPT

## 2024-04-09 PROCEDURE — 99024 POSTOP FOLLOW-UP VISIT: CPT | Mod: ,,, | Performed by: FAMILY MEDICINE

## 2024-04-09 RX ORDER — ESCITALOPRAM OXALATE 10 MG/1
1 TABLET ORAL NIGHTLY
COMMUNITY

## 2024-04-09 NOTE — ED TRIAGE NOTES
Pt here for suture removal from right great toe. Area is scabbed over with sutures intact. Area appears slightly reddened. Pt deneis pain at this time. States that she does have an occasional shooting pain that goes through it.

## 2024-04-09 NOTE — ED PROVIDER NOTES
Encounter Date: 4/9/2024       History     Chief Complaint   Patient presents with    Suture / Staple Removal     Right great toe     83 year old female presents for right great toe suture removal.  The laceration wound of the right dorsal great toe is 2 weeks old.  The wound is intact and healing.    The history is provided by the patient.     Review of patient's allergies indicates:   Allergen Reactions    Aspirin Nausea And Vomiting    Codeine Nausea And Vomiting    Loratadine Nausea And Vomiting     Past Medical History:   Diagnosis Date    Acid reflux     Colorectal cancer 20 years ago    Hypertension     Unspecified glaucoma      Past Surgical History:   Procedure Laterality Date    COLON SURGERY      COLOSTOMY      EYE SURGERY      HYSTERECTOMY       History reviewed. No pertinent family history.  Social History     Tobacco Use    Smoking status: Former     Types: Cigarettes    Smokeless tobacco: Never   Substance Use Topics    Alcohol use: Never    Drug use: Never     Review of Systems   Constitutional:  Positive for activity change, appetite change and fatigue.   HENT:  Positive for congestion.    Respiratory:  Negative for apnea.    Cardiovascular:  Negative for chest pain.   Gastrointestinal:  Negative for abdominal distention.   Endocrine: Negative for cold intolerance.   Genitourinary:  Negative for difficulty urinating.   Musculoskeletal:  Positive for arthralgias.   Skin:  Positive for wound.   Neurological:  Negative for dizziness.   All other systems reviewed and are negative.      Physical Exam     Initial Vitals [04/09/24 0826]   BP Pulse Resp Temp SpO2   (!) 167/76 69 18 97.9 °F (36.6 °C) 95 %      MAP       --         Physical Exam    Constitutional: She appears well-developed and well-nourished.   HENT:   Head: Normocephalic and atraumatic.   Eyes: Pupils are equal, round, and reactive to light.   Neck:   Normal range of motion.  Cardiovascular:  Normal rate.           Pulmonary/Chest: Breath  sounds normal.   Abdominal: Bowel sounds are normal.   Musculoskeletal:         General: Normal range of motion.      Cervical back: Normal range of motion.     Neurological: She is alert and oriented to person, place, and time.   Skin:   Right great toe wound is healed. Sutures removed by nursing staff.   Psychiatric: She has a normal mood and affect.         Medical Screening Exam   See Full Note    ED Course   Procedures  Labs Reviewed - No data to display       Imaging Results    None          Medications - No data to display  Medical Decision Making                                    Clinical Impression:   Final diagnoses:  [Z48.02] Visit for suture removal (Primary)  [I10] Primary hypertension        ED Disposition Condition    Discharge Stable          ED Prescriptions    None       Follow-up Information       Follow up With Specialties Details Why Contact Info    Mara Jo MD Family Medicine In 1 day  [address]  [facility]   [place] [id number] [phone]               Eunice Osuna MD  04/09/24 0857

## 2024-04-16 NOTE — ADDENDUM NOTE
Encounter addended by: Terra Ram on: 4/16/2024 12:11 PM   Actions taken: SmartForm saved, Flowsheet accepted, Charge Capture section accepted
0

## 2024-08-02 ENCOUNTER — HOSPITAL ENCOUNTER (EMERGENCY)
Facility: HOSPITAL | Age: 84
Discharge: HOME | End: 2024-08-02
Attending: EMERGENCY MEDICINE
Payer: COMMERCIAL

## 2024-08-02 VITALS
OXYGEN SATURATION: 97 % | DIASTOLIC BLOOD PRESSURE: 63 MMHG | BODY MASS INDEX: 21.97 KG/M2 | WEIGHT: 140 LBS | HEART RATE: 66 BPM | HEIGHT: 67 IN | TEMPERATURE: 98 F | SYSTOLIC BLOOD PRESSURE: 155 MMHG | RESPIRATION RATE: 23 BRPM

## 2024-08-02 DIAGNOSIS — R07.9 CHEST PAIN: ICD-10-CM

## 2024-08-02 DIAGNOSIS — R06.02 SHORTNESS OF BREATH: Primary | ICD-10-CM

## 2024-08-02 PROBLEM — S61.411A LACERATION OF RIGHT HAND WITHOUT FOREIGN BODY: Status: ACTIVE | Noted: 2024-08-02

## 2024-08-02 LAB
ALBUMIN SERPL BCP-MCNC: 4.2 G/DL (ref 3.5–5)
ALBUMIN/GLOB SERPL: 1.6 {RATIO}
ALP SERPL-CCNC: 101 U/L (ref 55–142)
ALT SERPL W P-5'-P-CCNC: 26 U/L (ref 13–56)
ANION GAP SERPL CALCULATED.3IONS-SCNC: 10 MMOL/L (ref 7–16)
APTT PPP: 27.3 SECONDS (ref 25.2–37.3)
AST SERPL W P-5'-P-CCNC: 18 U/L (ref 15–37)
BASOPHILS # BLD AUTO: 0.02 K/UL (ref 0–0.2)
BASOPHILS NFR BLD AUTO: 0.2 % (ref 0–1)
BILIRUB SERPL-MCNC: 0.6 MG/DL (ref ?–1.2)
BILIRUB UR QL STRIP: NEGATIVE
BUN SERPL-MCNC: 14 MG/DL (ref 7–18)
BUN/CREAT SERPL: 22 (ref 6–20)
CALCIUM SERPL-MCNC: 10 MG/DL (ref 8.5–10.1)
CHLORIDE SERPL-SCNC: 98 MMOL/L (ref 98–107)
CLARITY UR: CLEAR
CO2 SERPL-SCNC: 27 MMOL/L (ref 21–32)
COLOR UR: YELLOW
CREAT SERPL-MCNC: 0.65 MG/DL (ref 0.55–1.02)
DIFFERENTIAL METHOD BLD: ABNORMAL
EGFR (NO RACE VARIABLE) (RUSH/TITUS): 88 ML/MIN/1.73M2
EOSINOPHIL # BLD AUTO: 0.01 K/UL (ref 0–0.5)
EOSINOPHIL NFR BLD AUTO: 0.1 % (ref 1–4)
ERYTHROCYTE [DISTWIDTH] IN BLOOD BY AUTOMATED COUNT: 11.9 % (ref 11.5–14.5)
GLOBULIN SER-MCNC: 2.6 G/DL (ref 2–4)
GLUCOSE SERPL-MCNC: 147 MG/DL (ref 74–106)
GLUCOSE UR STRIP-MCNC: NORMAL MG/DL
HCT VFR BLD AUTO: 40.8 % (ref 38–47)
HGB BLD-MCNC: 13.8 G/DL (ref 12–16)
IMM GRANULOCYTES # BLD AUTO: 0.04 K/UL (ref 0–0.04)
IMM GRANULOCYTES NFR BLD: 0.4 % (ref 0–0.4)
INR BLD: 0.97
KETONES UR STRIP-SCNC: ABNORMAL MG/DL
LACTATE SERPL-SCNC: 1.9 MMOL/L (ref 0.4–2)
LEUKOCYTE ESTERASE UR QL STRIP: NEGATIVE
LIPASE SERPL-CCNC: 25 U/L (ref 16–77)
LYMPHOCYTES # BLD AUTO: 0.82 K/UL (ref 1–4.8)
LYMPHOCYTES NFR BLD AUTO: 7.4 % (ref 27–41)
MAGNESIUM SERPL-MCNC: 2 MG/DL (ref 1.7–2.3)
MCH RBC QN AUTO: 30.5 PG (ref 27–31)
MCHC RBC AUTO-ENTMCNC: 33.8 G/DL (ref 32–36)
MCV RBC AUTO: 90.1 FL (ref 80–96)
MONOCYTES # BLD AUTO: 0.5 K/UL (ref 0–0.8)
MONOCYTES NFR BLD AUTO: 4.5 % (ref 2–6)
MPC BLD CALC-MCNC: 9.1 FL (ref 9.4–12.4)
NEUTROPHILS # BLD AUTO: 9.67 K/UL (ref 1.8–7.7)
NEUTROPHILS NFR BLD AUTO: 87.4 % (ref 53–65)
NITRITE UR QL STRIP: NEGATIVE
NRBC # BLD AUTO: 0 X10E3/UL
NRBC, AUTO (.00): 0 %
PH UR STRIP: 7.5 PH UNITS
PLATELET # BLD AUTO: 280 K/UL (ref 150–400)
POTASSIUM SERPL-SCNC: 3.8 MMOL/L (ref 3.5–5.1)
PROT SERPL-MCNC: 6.8 G/DL (ref 6.4–8.2)
PROT UR QL STRIP: 20
PROTHROMBIN TIME: 12.8 SECONDS (ref 11.7–14.7)
RBC # BLD AUTO: 4.53 M/UL (ref 4.2–5.4)
RBC # UR STRIP: NEGATIVE /UL
SODIUM SERPL-SCNC: 131 MMOL/L (ref 136–145)
SP GR UR STRIP: 1.02
TROPONIN I SERPL DL<=0.01 NG/ML-MCNC: 11.1 PG/ML
TROPONIN I SERPL DL<=0.01 NG/ML-MCNC: 7.9 PG/ML
UROBILINOGEN UR STRIP-ACNC: NORMAL MG/DL
WBC # BLD AUTO: 11.06 K/UL (ref 4.5–11)

## 2024-08-02 PROCEDURE — 84484 ASSAY OF TROPONIN QUANT: CPT | Performed by: EMERGENCY MEDICINE

## 2024-08-02 PROCEDURE — 93010 ELECTROCARDIOGRAM REPORT: CPT | Mod: ,,, | Performed by: INTERNAL MEDICINE

## 2024-08-02 PROCEDURE — 83605 ASSAY OF LACTIC ACID: CPT | Performed by: EMERGENCY MEDICINE

## 2024-08-02 PROCEDURE — 80053 COMPREHEN METABOLIC PANEL: CPT | Performed by: EMERGENCY MEDICINE

## 2024-08-02 PROCEDURE — 36415 COLL VENOUS BLD VENIPUNCTURE: CPT | Performed by: EMERGENCY MEDICINE

## 2024-08-02 PROCEDURE — 85025 COMPLETE CBC W/AUTO DIFF WBC: CPT | Performed by: EMERGENCY MEDICINE

## 2024-08-02 PROCEDURE — 99285 EMERGENCY DEPT VISIT HI MDM: CPT | Mod: 25

## 2024-08-02 PROCEDURE — 85610 PROTHROMBIN TIME: CPT | Performed by: EMERGENCY MEDICINE

## 2024-08-02 PROCEDURE — 83690 ASSAY OF LIPASE: CPT | Performed by: EMERGENCY MEDICINE

## 2024-08-02 PROCEDURE — 81003 URINALYSIS AUTO W/O SCOPE: CPT | Performed by: EMERGENCY MEDICINE

## 2024-08-02 PROCEDURE — 93005 ELECTROCARDIOGRAM TRACING: CPT

## 2024-08-02 PROCEDURE — 85730 THROMBOPLASTIN TIME PARTIAL: CPT | Performed by: EMERGENCY MEDICINE

## 2024-08-02 PROCEDURE — 83735 ASSAY OF MAGNESIUM: CPT | Performed by: EMERGENCY MEDICINE

## 2024-08-02 NOTE — DISCHARGE INSTRUCTIONS
Continue current medications as prescribed.  Follow up in clinic with primary care provider in 2-3 days for recheck.  Return to emergency department for any worsening or further problems.

## 2024-08-02 NOTE — ED PROVIDER NOTES
Encounter Date: 8/2/2024       History     Chief Complaint   Patient presents with    Shortness of Breath    Nausea    Abdominal Pain     85 y/o female who arrives via ems and reports chest pain and shortness of breath.  EMS says chest pain resolved with 2 sublingual nitro which relieved her epigastric / chest pain.  She reports worsening shortness of breath and discomfort when walking to the mail box.  She says pain was moderate.          Review of patient's allergies indicates:   Allergen Reactions    Aspirin Nausea And Vomiting    Codeine Nausea And Vomiting    Loratadine Nausea And Vomiting     Past Medical History:   Diagnosis Date    Acid reflux     Colorectal cancer 20 years ago    Hypertension     Unspecified glaucoma      Past Surgical History:   Procedure Laterality Date    COLON SURGERY      COLOSTOMY      EYE SURGERY      HYSTERECTOMY       No family history on file.  Social History     Tobacco Use    Smoking status: Former     Types: Cigarettes    Smokeless tobacco: Never   Substance Use Topics    Alcohol use: Never    Drug use: Never     Review of Systems   All other systems reviewed and are negative.      Physical Exam     Initial Vitals   BP Pulse Resp Temp SpO2   08/02/24 0205 08/02/24 0201 08/02/24 0204 08/02/24 0207 08/02/24 0201   (!) 165/72 68 17 97.5 °F (36.4 °C) 97 %      MAP       --                Physical Exam    Nursing note and vitals reviewed.  Constitutional: She appears well-developed and well-nourished.   HENT:   Head: Normocephalic and atraumatic.   Nose: Nose normal.   Mouth/Throat: Oropharynx is clear and moist.   Eyes: Conjunctivae and EOM are normal. Pupils are equal, round, and reactive to light.   Neck: Neck supple.   Normal range of motion.  Cardiovascular:  Normal rate, regular rhythm and normal heart sounds.           Pulmonary/Chest: Breath sounds normal.   Abdominal: Abdomen is soft. Bowel sounds are normal.   Musculoskeletal:         General: Normal range of motion.       Cervical back: Normal range of motion and neck supple.     Neurological: She is alert and oriented to person, place, and time. She has normal strength. GCS score is 15. GCS eye subscore is 4. GCS verbal subscore is 5. GCS motor subscore is 6.   Skin: Skin is warm.         Medical Screening Exam   See Full Note    ED Course   Procedures  Labs Reviewed   COMPREHENSIVE METABOLIC PANEL - Abnormal       Result Value    Sodium 131 (*)     Potassium 3.8      Chloride 98      CO2 27      Anion Gap 10      Glucose 147 (*)     BUN 14      Creatinine 0.65      BUN/Creatinine Ratio 22 (*)     Calcium 10.0      Total Protein 6.8      Albumin 4.2      Globulin 2.6      A/G Ratio 1.6      Bilirubin, Total 0.6      Alk Phos 101      ALT 26      AST 18      eGFR 88     CBC WITH DIFFERENTIAL - Abnormal    WBC 11.06 (*)     RBC 4.53      Hemoglobin 13.8      Hematocrit 40.8      MCV 90.1      MCH 30.5      MCHC 33.8      RDW 11.9      Platelet Count 280      MPV 9.1 (*)     Neutrophils % 87.4 (*)     Lymphocytes % 7.4 (*)     Monocytes % 4.5      Eosinophils % 0.1 (*)     Basophils % 0.2      Immature Granulocytes % 0.4      nRBC, Auto 0.0      Neutrophils, Abs 9.67 (*)     Lymphocytes, Absolute 0.82 (*)     Monocytes, Absolute 0.50      Eosinophils, Absolute 0.01      Basophils, Absolute 0.02      Immature Granulocytes, Absolute 0.04      nRBC, Absolute 0.00      Diff Type Auto     URINALYSIS, REFLEX TO URINE CULTURE - Abnormal    Color, UA Yellow      Clarity, UA Clear      pH, UA 7.5      Leukocytes, UA Negative      Nitrites, UA Negative      Protein, UA 20 (*)     Glucose, UA Normal      Ketones, UA Trace      Urobilinogen, UA Normal      Bilirubin, UA Negative      Blood, UA Negative      Specific Gravity, UA 1.019     MAGNESIUM - Normal    Magnesium 2.0     APTT - Normal    PTT 27.3     TROPONIN I - Normal    Troponin I High Sensitivity 7.9     PROTIME-INR - Normal    PT 12.8      INR 0.97     LACTIC ACID, PLASMA - Normal     Lactic Acid 1.9     LIPASE - Normal    Lipase 25     TROPONIN I - Normal    Troponin I High Sensitivity 11.1     CBC W/ AUTO DIFFERENTIAL    Narrative:     The following orders were created for panel order CBC auto differential.  Procedure                               Abnormality         Status                     ---------                               -----------         ------                     CBC with Differential[[id number]]       Abnormal            Final result                 Please view results for these tests on the individual orders.          Imaging Results              X-Ray Chest AP Portable (In process)                      Medications - No data to display  Medical Decision Making  Chest pain and dyspnea...    Ddx:  cardiac vs deconditioning vs other    Observation     Amount and/or Complexity of Data Reviewed  Labs: ordered. Decision-making details documented in ED Course.  Radiology: ordered. Decision-making details documented in ED Course.  ECG/medicine tests: ordered. Decision-making details documented in ED Course.               ED Course as of 08/02/24 0730   Fri Aug 02, 2024   0603 Awaiting Repeat troponin. [BB]   0643 Medical decision-making:  Differential diagnosis includes shortness of breath, pneumonia, CHF, STEMI, NSTEMI, ACS.  All testing ordered and interpreted by me. [BB]   0644 Chest x-ray by my interpretation shows no acute disease. [BB]   0644 Initial troponin is normal.   [BB]   0644 CMP is normal.  Urinalysis is normal.  CBC is normal. [BB]   0645 Awaiting repeat troponin. [BB]   0726 Repeat troponin is normal and not significantly changed from initial troponin. [BB]   0729 On my exam patient denies current shortness breath or chest pain.  Ready for discharge home. [BB]      ED Course User Index  [BB] Bipin Villarreal MD                           Clinical Impression:   Final diagnoses:  [R06.02] Shortness of breath (Primary)  [R07.9] Chest pain        ED Disposition Condition     Discharge Stable          ED Prescriptions    None       Follow-up Information       Follow up With Specialties Details Why Contact Info    Mara Jo MD Family Medicine  As needed [address]  [facility]  [place] [id number] [phone]               Bipin Villarreal MD  08/02/24 6190

## 2024-08-05 LAB
OHS QRS DURATION: 84 MS
OHS QTC CALCULATION: 443 MS

## 2024-08-14 ENCOUNTER — OFFICE VISIT (OUTPATIENT)
Dept: CARDIOLOGY | Facility: CLINIC | Age: 84
End: 2024-08-14
Payer: MEDICARE

## 2024-08-14 ENCOUNTER — HOSPITAL ENCOUNTER (OUTPATIENT)
Dept: CARDIOLOGY | Facility: HOSPITAL | Age: 84
Discharge: HOME | End: 2024-08-14
Attending: INTERNAL MEDICINE
Payer: MEDICARE

## 2024-08-14 VITALS
SYSTOLIC BLOOD PRESSURE: 115 MMHG | HEIGHT: 67 IN | BODY MASS INDEX: 21.35 KG/M2 | RESPIRATION RATE: 16 BRPM | OXYGEN SATURATION: 98 % | HEART RATE: 67 BPM | WEIGHT: 136 LBS | DIASTOLIC BLOOD PRESSURE: 70 MMHG

## 2024-08-14 DIAGNOSIS — Z74.09 COVID-19 LONG HAULER MANIFESTING CHRONIC DECREASED MOBILITY AND ENDURANCE: ICD-10-CM

## 2024-08-14 DIAGNOSIS — R06.09 EXERTIONAL DYSPNEA: ICD-10-CM

## 2024-08-14 DIAGNOSIS — R00.2 INTERMITTENT PALPITATIONS: ICD-10-CM

## 2024-08-14 DIAGNOSIS — R07.9 CHEST PAIN: ICD-10-CM

## 2024-08-14 DIAGNOSIS — C18.9 MALIGNANT NEOPLASM OF COLON, UNSPECIFIED PART OF COLON: ICD-10-CM

## 2024-08-14 DIAGNOSIS — R94.31 ABNORMAL ELECTROCARDIOGRAM (ECG) (EKG): ICD-10-CM

## 2024-08-14 DIAGNOSIS — I25.9 CHEST PAIN DUE TO MYOCARDIAL ISCHEMIA, UNSPECIFIED ISCHEMIC CHEST PAIN TYPE: Primary | ICD-10-CM

## 2024-08-14 DIAGNOSIS — U09.9 COVID-19 LONG HAULER MANIFESTING CHRONIC DECREASED MOBILITY AND ENDURANCE: ICD-10-CM

## 2024-08-14 DIAGNOSIS — I10 ESSENTIAL HYPERTENSION: ICD-10-CM

## 2024-08-14 DIAGNOSIS — Z13.6 ENCOUNTER FOR SCREENING FOR CARDIOVASCULAR DISORDERS: ICD-10-CM

## 2024-08-14 PROCEDURE — 99215 OFFICE O/P EST HI 40 MIN: CPT | Mod: PBBFAC,25 | Performed by: INTERNAL MEDICINE

## 2024-08-14 PROCEDURE — 93005 ELECTROCARDIOGRAM TRACING: CPT | Mod: PBBFAC | Performed by: INTERNAL MEDICINE

## 2024-08-14 PROCEDURE — 99999 PR PBB SHADOW E&M-EST. PATIENT-LVL V: CPT | Mod: PBBFAC,,, | Performed by: INTERNAL MEDICINE

## 2024-08-14 PROCEDURE — 99205 OFFICE O/P NEW HI 60 MIN: CPT | Mod: S$PBB,,, | Performed by: INTERNAL MEDICINE

## 2024-08-14 PROCEDURE — 93010 ELECTROCARDIOGRAM REPORT: CPT | Mod: S$PBB,,, | Performed by: INTERNAL MEDICINE

## 2024-08-14 PROCEDURE — 93246 EXT ECG>7D<15D RECORDING: CPT

## 2024-08-14 NOTE — PATIENT INSTRUCTIONS
Continue current meds  Wear monitor x 2 weeks, return by mail  [employer] cardiolyte stress test, echo  Conference appt to discuss results.

## 2024-08-14 NOTE — PROGRESS NOTES
PCP: Mara Jo MD    Referring Provider:     Subjective:   Yaritza Jiang is a 84 y.o. female with hx of hypertension who presents for evaluation of fatiuge and shortness of breath, decreased exercise tolerance    Pt reports her exercise tolerance had greatly decreased and not recovered since loly COVID approximately one year ago.  She notes is unable to walk mailbox, approximately 50 yards without provocation of shortness of breath, has to sit down to rest.  She reports severe SOB with minimal activity.  She lives alone, does her own housekeeping and shopping, however is taking much longer, has to sit to rest much more frequently.  She has noted occasional ankle swelling, which resolves overnight.  She has no previous hx of cardiac problems.  She reports had stress test five or six years ago which was reportedly normal.  Pt reports heart races with minimal activity.  She chest pain or pressure, does get cramps in right side of chest with exertion, 7/10, feels like a knot, lasts several minutes, resolves with rest.  She also complains of bilat leg cramps at night, has to get up to walk around.           Fhx: Mom  from throat cancer, Dad  from CAD at 90.  Sister with hx of OHS for leaky heart valve, denies fm hx CAD, CHF, DM, hyptn.  Shx:   Past Surgical History:   Procedure Laterality Date    COLON SURGERY      COLOSTOMY      EYE SURGERY      HYSTERECTOMY            EKG - NSR, inferior EKG changes suggestive of inferior ischemia.     ECHO - No results found for this or any previous visit.       CATH - No results found for this or any previous visit.       Stress - No results found for this or any previous visit.       Lab Results   Component Value Date     (L) 2024    K 3.8 2024    CL 98 2024    CO2 27 2024    BUN 14 2024    CREATININE 0.65 2024    CALCIUM 10.0 2024    ANIONGAP 10 2024    EGFRNONAA 92 2022       Lab Results   Component  Value Date    CHOL 198 08/16/2022    CHOL 187 05/17/2021     Lab Results   Component Value Date    HDL 93 (H) 08/16/2022    HDL 89 (H) 05/17/2021     Lab Results   Component Value Date    LDLCALC 95 08/16/2022    LDLCALC 87 05/17/2021     Lab Results   Component Value Date    TRIG 52 08/16/2022    TRIG 54 05/17/2021     Lab Results   Component Value Date    CHOLHDL 2.1 08/16/2022    CHOLHDL 2.1 05/17/2021       Lab Results   Component Value Date    WBC 11.06 (H) 08/02/2024    HGB 13.8 08/02/2024    HCT 40.8 08/02/2024    MCV 90.1 08/02/2024     08/02/2024           Current Outpatient Medications:     amLODIPine (NORVASC) 2.5 MG tablet, Take 1 tablet every day by oral route for 90 days, for blood pressure., Disp: , Rfl:     EScitalopram oxalate (LEXAPRO) 10 MG tablet, Take 1 tablet by mouth every evening., Disp: , Rfl:     latanoprost 0.005 % ophthalmic solution, Place 1 drop into both eyes every evening., Disp: , Rfl:     meclizine (ANTIVERT) 25 mg tablet, 25 mg 2 (two) times daily as needed., Disp: , Rfl:     metoprolol succinate (TOPROL-XL) 50 MG 24 hr tablet, Take 1 tablet by mouth once daily., Disp: , Rfl:     polyethylene glycol (GLYCOLAX) 17 gram PwPk, Take 17 g by mouth daily as needed. (Patient not taking: Reported on 8/14/2024), Disp: , Rfl:     Review of Systems   Constitutional: Negative for diaphoresis, malaise/fatigue, night sweats and weight gain.   HENT:  Negative for congestion, ear pain, hearing loss, nosebleeds and sore throat.         Hx salivary gland stones surgically removed.    Eyes:  Positive for pain. Negative for blurred vision, double vision, photophobia and visual disturbance.        Has glacoma, controlled with drop, following with optho.   Cardiovascular:  Positive for chest pain, dyspnea on exertion, irregular heartbeat and palpitations. Negative for claudication, leg swelling, near-syncope, orthopnea and syncope.   Respiratory:  Positive for shortness of breath. Negative for  "cough, sleep disturbances due to breathing, snoring and wheezing.    Endocrine: Negative for cold intolerance, heat intolerance, polydipsia, polyphagia and polyuria.   Hematologic/Lymphatic: Negative for bleeding problem. Does not bruise/bleed easily.   Skin:  Negative for dry skin, flushing, itching, rash and skin cancer.   Musculoskeletal:  Positive for arthritis. Negative for back pain, falls, joint pain, muscle cramps, muscle weakness and myalgias.        Primarily right hip, limits activity, unable to climb stairs without pain, somewhat improved following steroid shot.    Gastrointestinal:  Positive for heartburn. Negative for abdominal pain, change in bowel habit, constipation, diarrhea, dysphagia, nausea and vomiting.        Colon cancer, bowel resection, has colostomy.    Genitourinary:  Negative for bladder incontinence, dysuria, flank pain, frequency and nocturia.   Neurological:  Positive for numbness. Negative for dizziness, focal weakness, headaches, light-headedness, loss of balance, paresthesias and seizures.        Bilat lower extremity numbness post colon resection.    Psychiatric/Behavioral:  Negative for depression, memory loss and substance abuse. The patient is not nervous/anxious.    Allergic/Immunologic: Negative for environmental allergies.          Objective:   /70   Pulse 67   Resp 16   Ht 5' 7" (1.702 m)   Wt 61.7 kg (136 lb)   SpO2 98%   BMI 21.30 kg/m²       Physical Exam  Vitals and nursing note reviewed.   Constitutional:       Appearance: Normal appearance. She is obese.   HENT:      Head: Normocephalic and atraumatic.      Right Ear: External ear normal.      Left Ear: External ear normal.   Eyes:      General: No scleral icterus.        Right eye: No discharge.         Left eye: No discharge.      Extraocular Movements: Extraocular movements intact.      Conjunctiva/sclera: Conjunctivae normal.      Pupils: Pupils are equal, round, and reactive to light. "   Cardiovascular:      Rate and Rhythm: Normal rate and regular rhythm.      Pulses: Normal pulses.      Heart sounds: Normal heart sounds. No murmur heard.     No friction rub. No gallop.   Pulmonary:      Effort: Pulmonary effort is normal.      Breath sounds: Normal breath sounds. No wheezing, rhonchi or rales.   Chest:      Chest wall: No tenderness.   Abdominal:      General: Abdomen is flat. Bowel sounds are normal. There is no distension.      Palpations: Abdomen is soft.      Tenderness: There is no abdominal tenderness. There is no guarding or rebound.      Comments: Colostomy left lower quad   Musculoskeletal:         General: No swelling or tenderness. Normal range of motion.      Cervical back: Normal range of motion and neck supple.      Right lower leg: Edema present.      Left lower leg: Edema present.   Skin:     General: Skin is warm and dry.      Findings: No erythema or rash.   Neurological:      General: No focal deficit present.      Mental Status: She is alert and oriented to person, place, and time.      Cranial Nerves: No cranial nerve deficit.      Motor: No weakness.      Gait: Gait normal.   Psychiatric:         Mood and Affect: Mood normal.         Behavior: Behavior normal.         Thought Content: Thought content normal.         Judgment: Judgment normal.         Assessment:     1. Chest pain  Ambulatory referral/consult to Cardiology    EKG 12-lead    Cardiac Monitor - 3-15 Day Adult (Cupid Only)    Echo    Nuclear Stress Test    EKG 12-lead    Atyoical chest pain, with shortness of breath with exertion, abnormal EKG, will order Lexiscan cardiolyte stress imaging.      2. Exertional dyspnea  Cardiac Monitor - 3-15 Day Adult (Cupid Only)    Echo    Nuclear Stress Test    unclear etiology, will order echo to eval for structural heart disease post COVID      3. Essential hypertension      Contorlled on current meds      4. Malignant neoplasm of colon, unspecified part of colon       Completed chemo and rads, surgery in 2000      5. Intermittent palpitations      Pt reports palpitations not associated with chest pain, shortness of breath, will place on outpatient telemetry.      6. 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      7. Abnormal electrocardiogram (ECG) (EKG)  Cardiac Monitor - 3-15 Day Adult (Cupid Only)    Echo    NM Myocardial Perfusion Spect Multi Pharmacologic    Nuclear Stress Test    ST-T wave changes suggestive of inferior ischemia.      8. Encounter for screening for cardiovascular disorders  NM Myocardial Perfusion Spect Multi Pharmacologic            Plan:   Will order Zio, echo, lexiscan cardiolyte, pt unable to walk on treadmill due to left hip pain.

## 2024-08-18 PROBLEM — R00.2 INTERMITTENT PALPITATIONS: Status: ACTIVE | Noted: 2024-08-18

## 2024-08-18 PROBLEM — C18.9 MALIGNANT NEOPLASM OF COLON: Status: ACTIVE | Noted: 2024-08-18

## 2024-08-18 PROBLEM — U09.9 COVID-19 LONG HAULER MANIFESTING CHRONIC DECREASED MOBILITY AND ENDURANCE: Status: ACTIVE | Noted: 2024-08-18

## 2024-08-18 PROBLEM — Z74.09 COVID-19 LONG HAULER MANIFESTING CHRONIC DECREASED MOBILITY AND ENDURANCE: Status: ACTIVE | Noted: 2024-08-18

## 2024-08-18 LAB
OHS QRS DURATION: 86 MS
OHS QTC CALCULATION: 462 MS

## 2024-08-20 DIAGNOSIS — Z93.3 COLOSTOMY STATUS: Primary | ICD-10-CM

## 2024-08-20 DIAGNOSIS — Z85.038 HISTORY OF COLON CANCER: ICD-10-CM

## 2024-09-05 ENCOUNTER — HOSPITAL ENCOUNTER (OUTPATIENT)
Dept: RADIOLOGY | Facility: HOSPITAL | Age: 84
Discharge: HOME | End: 2024-09-05
Attending: INTERNAL MEDICINE
Payer: MEDICARE

## 2024-09-05 ENCOUNTER — HOSPITAL ENCOUNTER (OUTPATIENT)
Dept: CARDIOLOGY | Facility: HOSPITAL | Age: 84
Discharge: HOME | End: 2024-09-05
Attending: INTERNAL MEDICINE
Payer: MEDICARE

## 2024-09-05 VITALS — BODY MASS INDEX: 21.35 KG/M2 | WEIGHT: 136 LBS | HEIGHT: 67 IN

## 2024-09-05 DIAGNOSIS — R94.31 ABNORMAL ELECTROCARDIOGRAM (ECG) (EKG): ICD-10-CM

## 2024-09-05 DIAGNOSIS — R06.09 EXERTIONAL DYSPNEA: ICD-10-CM

## 2024-09-05 DIAGNOSIS — I25.9 CHEST PAIN DUE TO MYOCARDIAL ISCHEMIA, UNSPECIFIED ISCHEMIC CHEST PAIN TYPE: ICD-10-CM

## 2024-09-05 DIAGNOSIS — Z13.6 ENCOUNTER FOR SCREENING FOR CARDIOVASCULAR DISORDERS: ICD-10-CM

## 2024-09-05 LAB
CV STRESS BASE HR: 60 BPM
DIASTOLIC BLOOD PRESSURE: 64 MMHG
OHS CV CPX 1 MINUTE RECOVERY HEART RATE: 83 BPM
OHS CV CPX 85 PERCENT MAX PREDICTED HEART RATE MALE: 117
OHS CV CPX MAX PREDICTED HEART RATE: 138
OHS CV CPX PATIENT IS FEMALE: 1
OHS CV CPX PATIENT IS MALE: 0
OHS CV CPX PEAK DIASTOLIC BLOOD PRESSURE: 71 MMHG
OHS CV CPX PEAK HEAR RATE: 86 BPM
OHS CV CPX PEAK RATE PRESSURE PRODUCT: NORMAL
OHS CV CPX PEAK SYSTOLIC BLOOD PRESSURE: 175 MMHG
OHS CV CPX PERCENT MAX PREDICTED HEART RATE ACHIEVED: 64
OHS CV CPX RATE PRESSURE PRODUCT PRESENTING: NORMAL
SYSTOLIC BLOOD PRESSURE: 174 MMHG

## 2024-09-05 PROCEDURE — 93016 CV STRESS TEST SUPVJ ONLY: CPT | Mod: ,,, | Performed by: INTERNAL MEDICINE

## 2024-09-05 PROCEDURE — C8929 TTE W OR WO FOL WCON,DOPPLER: HCPCS

## 2024-09-05 PROCEDURE — 78452 HT MUSCLE IMAGE SPECT MULT: CPT | Mod: 26,,, | Performed by: STUDENT IN AN ORGANIZED HEALTH CARE EDUCATION/TRAINING PROGRAM

## 2024-09-05 PROCEDURE — 93306 TTE W/DOPPLER COMPLETE: CPT | Mod: 26,,, | Performed by: INTERNAL MEDICINE

## 2024-09-05 PROCEDURE — 93018 CV STRESS TEST I&R ONLY: CPT | Mod: ,,, | Performed by: INTERNAL MEDICINE

## 2024-09-05 PROCEDURE — A9500 TC99M SESTAMIBI: HCPCS | Performed by: INTERNAL MEDICINE

## 2024-09-05 PROCEDURE — 78452 HT MUSCLE IMAGE SPECT MULT: CPT | Mod: TC

## 2024-09-05 PROCEDURE — 63600175 PHARM REV CODE 636 W HCPCS: Performed by: INTERNAL MEDICINE

## 2024-09-05 PROCEDURE — 93017 CV STRESS TEST TRACING ONLY: CPT

## 2024-09-05 RX ORDER — REGADENOSON 0.08 MG/ML
0.4 INJECTION, SOLUTION INTRAVENOUS ONCE
Status: COMPLETED | OUTPATIENT
Start: 2024-09-05 | End: 2024-09-05

## 2024-09-05 RX ORDER — TETRAKIS(2-METHOXYISOBUTYLISOCYANIDE)COPPER(I) TETRAFLUOROBORATE 1 MG/ML
36 INJECTION, POWDER, LYOPHILIZED, FOR SOLUTION INTRAVENOUS
Status: COMPLETED | OUTPATIENT
Start: 2024-09-05 | End: 2024-09-05

## 2024-09-05 RX ORDER — TETRAKIS(2-METHOXYISOBUTYLISOCYANIDE)COPPER(I) TETRAFLUOROBORATE 1 MG/ML
10 INJECTION, POWDER, LYOPHILIZED, FOR SOLUTION INTRAVENOUS
Status: COMPLETED | OUTPATIENT
Start: 2024-09-05 | End: 2024-09-05

## 2024-09-05 RX ADMIN — KIT FOR THE PREPARATION OF TECHNETIUM TC99M SESTAMIBI 10 MILLICURIE: 1 INJECTION, POWDER, LYOPHILIZED, FOR SOLUTION PARENTERAL at 08:09

## 2024-09-05 RX ADMIN — REGADENOSON 0.4 MG: 0.08 INJECTION, SOLUTION INTRAVENOUS at 10:09

## 2024-09-05 RX ADMIN — KIT FOR THE PREPARATION OF TECHNETIUM TC99M SESTAMIBI 36 MILLICURIE: 1 INJECTION, POWDER, LYOPHILIZED, FOR SOLUTION PARENTERAL at 10:09

## 2024-09-06 LAB
AORTIC ROOT ANNULUS: 2.02 CM
AORTIC VALVE CUSP SEPERATION: 1.64 CM
AV INDEX (PROSTH): 0.53
AV MEAN GRADIENT: 6 MMHG
AV PEAK GRADIENT: 34 MMHG
AV REGURGITATION PRESSURE HALF TIME: 862.91 MS
AV VALVE AREA BY VELOCITY RATIO: 0.91 CM²
AV VALVE AREA: 1.63 CM²
AV VELOCITY RATIO: 0.3
BSA FOR ECHO PROCEDURE: 1.71 M2
CV ECHO LV RWT: 0.33 CM
DOP CALC AO PEAK VEL: 2.93 M/S
DOP CALC AO VTI: 44.9 CM
DOP CALC LVOT AREA: 3 CM2
DOP CALC LVOT DIAMETER: 1.97 CM
DOP CALC LVOT PEAK VEL: 0.88 M/S
DOP CALC LVOT STROKE VOLUME: 73.12 CM3
DOP CALCLVOT PEAK VEL VTI: 24 CM
E WAVE DECELERATION TIME: 323.68 MSEC
E/A RATIO: 0.82
E/E' RATIO: 7 M/S
ECHO LV POSTERIOR WALL: 0.86 CM (ref 0.6–1.1)
FRACTIONAL SHORTENING: 35 % (ref 28–44)
INTERVENTRICULAR SEPTUM: 0.95 CM (ref 0.6–1.1)
IVC DIAMETER: 1.88 CM
LEFT ATRIUM AREA SYSTOLIC (APICAL 2 CHAMBER): 24.98 CM2
LEFT ATRIUM AREA SYSTOLIC (APICAL 4 CHAMBER): 19.79 CM2
LEFT ATRIUM VOLUME INDEX MOD: 39.8 ML/M2
LEFT ATRIUM VOLUME MOD: 68.38 CM3
LEFT INTERNAL DIMENSION IN SYSTOLE: 3.37 CM (ref 2.1–4)
LEFT VENTRICLE DIASTOLIC VOLUME INDEX: 73.63 ML/M2
LEFT VENTRICLE DIASTOLIC VOLUME: 126.65 ML
LEFT VENTRICLE END SYSTOLIC VOLUME APICAL 2 CHAMBER: 79.04 ML
LEFT VENTRICLE END SYSTOLIC VOLUME APICAL 4 CHAMBER: 53.82 ML
LEFT VENTRICLE MASS INDEX: 97 G/M2
LEFT VENTRICLE SYSTOLIC VOLUME INDEX: 27.1 ML/M2
LEFT VENTRICLE SYSTOLIC VOLUME: 46.54 ML
LEFT VENTRICULAR INTERNAL DIMENSION IN DIASTOLE: 5.15 CM (ref 3.5–6)
LEFT VENTRICULAR MASS: 167.47 G
LV LATERAL E/E' RATIO: 6.3 M/S
LV SEPTAL E/E' RATIO: 7.88 M/S
LVED V (TEICH): 126.65 ML
LVES V (TEICH): 46.54 ML
LVOT MG: 1.59 MMHG
LVOT MV: 0.59 CM/S
MV PEAK A VEL: 0.77 M/S
MV PEAK E VEL: 0.63 M/S
PISA AR MAX VEL: 3.47 M/S
PISA MRMAX VEL: 4.52 M/S
PISA TR MAX VEL: 2.69 M/S
PV PEAK GRADIENT: 3 MMHG
PV PEAK VELOCITY: 0.87 M/S
RA VOL SYS: 27.99 ML
RIGHT ATRIAL AREA: 12.4 CM2
RIGHT ATRIUM VOLUME AREA LENGTH APICAL 4 CHAMBER: 26.72 ML
RIGHT VENTRICLE DIASTOLIC BASEL DIMENSION: 3.6 CM
RIGHT VENTRICLE DIASTOLIC LENGTH: 6.7 CM
RIGHT VENTRICLE DIASTOLIC MID DIMENSION: 1.7 CM
RIGHT VENTRICULAR LENGTH IN DIASTOLE (APICAL 4-CHAMBER VIEW): 6.69 CM
RV MID DIAMA: 1.74 CM
TDI LATERAL: 0.1 M/S
TDI SEPTAL: 0.08 M/S
TDI: 0.09 M/S
TR MAX PG: 29 MMHG
TRICUSPID ANNULAR PLANE SYSTOLIC EXCURSION: 2.4 CM
Z-SCORE OF LEFT VENTRICULAR DIMENSION IN END DIASTOLE: 0.77
Z-SCORE OF LEFT VENTRICULAR DIMENSION IN END SYSTOLE: 1.04

## 2024-09-13 ENCOUNTER — TELEPHONE (OUTPATIENT)
Dept: CARDIOLOGY | Facility: CLINIC | Age: 84
End: 2024-09-13
Payer: MEDICARE

## 2024-09-13 NOTE — TELEPHONE ENCOUNTER
Spoke to let the patient know that we are waiting on her on her Zio Monitor results and that I will call her when they are in. Patient verbalized understanding.

## 2024-10-07 DIAGNOSIS — R55 SYNCOPE AND COLLAPSE: Primary | ICD-10-CM

## 2024-10-21 ENCOUNTER — OFFICE VISIT (OUTPATIENT)
Dept: CARDIOLOGY | Facility: CLINIC | Age: 84
End: 2024-10-21
Payer: MEDICARE

## 2024-10-21 VITALS
OXYGEN SATURATION: 97 % | HEIGHT: 67 IN | BODY MASS INDEX: 22.29 KG/M2 | WEIGHT: 142 LBS | DIASTOLIC BLOOD PRESSURE: 72 MMHG | HEART RATE: 72 BPM | SYSTOLIC BLOOD PRESSURE: 162 MMHG

## 2024-10-21 DIAGNOSIS — I10 ESSENTIAL HYPERTENSION: ICD-10-CM

## 2024-10-21 DIAGNOSIS — R00.2 INTERMITTENT PALPITATIONS: ICD-10-CM

## 2024-10-21 DIAGNOSIS — T50.B95A FATIGUE AFTER SEVERE ACUTE RESPIRATORY SYNDROME CORONAVIRUS 2 (SARS-COV-2) VACCINATION: ICD-10-CM

## 2024-10-21 DIAGNOSIS — R53.83 FATIGUE AFTER SEVERE ACUTE RESPIRATORY SYNDROME CORONAVIRUS 2 (SARS-COV-2) VACCINATION: ICD-10-CM

## 2024-10-21 DIAGNOSIS — R55 SYNCOPE AND COLLAPSE: ICD-10-CM

## 2024-10-21 DIAGNOSIS — R06.09 EXERTIONAL DYSPNEA: Primary | ICD-10-CM

## 2024-10-21 PROCEDURE — 99214 OFFICE O/P EST MOD 30 MIN: CPT | Mod: S$PBB,,, | Performed by: INTERNAL MEDICINE

## 2024-10-21 PROCEDURE — 99214 OFFICE O/P EST MOD 30 MIN: CPT | Mod: PBBFAC | Performed by: INTERNAL MEDICINE

## 2024-10-21 PROCEDURE — 99999 PR PBB SHADOW E&M-EST. PATIENT-LVL IV: CPT | Mod: PBBFAC,,, | Performed by: INTERNAL MEDICINE

## 2024-10-21 RX ORDER — DILTIAZEM HYDROCHLORIDE 240 MG/1
240 CAPSULE, COATED, EXTENDED RELEASE ORAL DAILY
Qty: 30 CAPSULE | Refills: 11 | Status: SHIPPED | OUTPATIENT
Start: 2024-10-21 | End: 2025-10-21

## 2024-11-11 ENCOUNTER — OFFICE VISIT (OUTPATIENT)
Dept: CARDIOLOGY | Facility: CLINIC | Age: 84
End: 2024-11-11
Payer: MEDICARE

## 2024-11-11 VITALS
SYSTOLIC BLOOD PRESSURE: 138 MMHG | OXYGEN SATURATION: 98 % | BODY MASS INDEX: 22.44 KG/M2 | WEIGHT: 143 LBS | DIASTOLIC BLOOD PRESSURE: 58 MMHG | HEART RATE: 58 BPM | HEIGHT: 67 IN

## 2024-11-11 DIAGNOSIS — I10 ESSENTIAL HYPERTENSION: Primary | ICD-10-CM

## 2024-11-11 DIAGNOSIS — R00.2 INTERMITTENT PALPITATIONS: ICD-10-CM

## 2024-11-11 DIAGNOSIS — R06.09 EXERTIONAL DYSPNEA: ICD-10-CM

## 2024-11-11 PROCEDURE — 99214 OFFICE O/P EST MOD 30 MIN: CPT | Mod: PBBFAC | Performed by: NURSE PRACTITIONER

## 2024-11-11 PROCEDURE — 99213 OFFICE O/P EST LOW 20 MIN: CPT | Mod: S$PBB,,, | Performed by: NURSE PRACTITIONER

## 2024-11-11 PROCEDURE — 99999 PR PBB SHADOW E&M-EST. PATIENT-LVL IV: CPT | Mod: PBBFAC,,, | Performed by: NURSE PRACTITIONER

## 2024-11-11 NOTE — PROGRESS NOTES
PCP: Mara Jo MD    Referring Provider:     Subjective:   Yaritza Jiang is a 84 y.o. female with hx of HTN, colorectal cancer, glaucoma  who presents for follow up.     Patient follows with Dr. Kamara here in Cardiology.  She was last seen 10/21/2024.  BP was elevated, metoprolol and amlodipine were discontinued and Cardizem  mg daily was started.  Pt is here today for blood pressure check.  She denies any chest pain, shortness of breath.  Today, she only has complaint of pain to her feet with walking which she attributes to arthritis.      Fhx: Mom  from throat cancer, Dad  from CAD at 90. Sister: OHS for leaky heart valve, denies fm hx CAD, CHF, DM, hyptn.   Shx:     EKG   Results for orders placed or performed in visit on 24   EKG 12-lead    Collection Time: 24  8:07 AM   Result Value Ref Range    QRS Duration 86 ms    [facility] QTC Calculation 462 ms    Narrative    Test Reason : R07.9,    Vent. Rate : 070 BPM     Atrial Rate : 070 BPM     P-R Int : 202 ms          QRS Dur : 086 ms      QT Int : 428 ms       P-R-T Axes : 077 071 070 degrees     QTc Int : 462 ms    Normal sinus rhythm  ST and T wave abnormality, consider inferolateral ischemia  Abnormal ECG  When compared with ECG of 02-AUG-2024 02:02,  PREVIOUS ECG IS PRESENT  Confirmed by Anjel Kamara DO ([id number]) on 2024 11:27:07 AM    Referred By: ROBYN LOGAN           Confirmed By:Anjel Kamara DO     ECHO Results for orders placed during the hospital encounter of 24    Echo    Interpretation Summary    Left Ventricle: The left ventricle is normal in size. Normal wall thickness. There is normal systolic function with a visually estimated ejection fraction of 60 - 65%. There is normal diastolic function.    Right Ventricle: Normal right ventricular cavity size. Systolic function is normal.    Aortic Valve: There is mild aortic valve sclerosis. There is mild aortic regurgitation.    Mitral Valve: There is mild  regurgitation.    Tricuspid Valve: There is mild regurgitation.    Pulmonic Valve: There is mild regurgitation.    [facility] No results found for this or any previous visit.      NM stress test 9/5/24:  Impression:     1.  Scintigraphically negative for ischemia or infarct.  2. the global left ventricular systolic function is normal with an LV ejection fraction of 67 % and no evidence of LV dilatation. Wall motion is normal.      ZIO cardiac monitor  8/14/24:  Predominant NSR  Rare PVCs, PACs  Two short runs of asymptomatic NSVT, longest 6 beats, rare ventricular bigeminy and trigeminy.   No significant pauses  Pt triggered events with NSR.       Lab Results   Component Value Date     (L) 08/02/2024    K 3.8 08/02/2024    CL 98 08/02/2024    CO2 27 08/02/2024    BUN 14 08/02/2024    CREATININE 0.65 08/02/2024    CALCIUM 10.0 08/02/2024    ANIONGAP 10 08/02/2024    EGFRNONAA 92 02/07/2022       Lab Results   Component Value Date    CHOL 198 08/16/2022    CHOL 187 05/17/2021     Lab Results   Component Value Date    HDL 93 (H) 08/16/2022    HDL 89 (H) 05/17/2021     Lab Results   Component Value Date    LDLCALC 95 08/16/2022    LDLCALC 87 05/17/2021     Lab Results   Component Value Date    TRIG 52 08/16/2022    TRIG 54 05/17/2021     Lab Results   Component Value Date    CHOLHDL 2.1 08/16/2022    CHOLHDL 2.1 05/17/2021       Lab Results   Component Value Date    WBC 11.06 (H) 08/02/2024    HGB 13.8 08/02/2024    HCT 40.8 08/02/2024    MCV 90.1 08/02/2024     08/02/2024           Current Outpatient Medications:     diltiaZEM (CARDIZEM CD) 240 MG 24 hr capsule, Take 1 capsule (240 mg total) by mouth once daily., Disp: 30 capsule, Rfl: 11    EScitalopram oxalate (LEXAPRO) 10 MG tablet, Take 1 tablet by mouth every evening., Disp: , Rfl:     latanoprost 0.005 % ophthalmic solution, Place 1 drop into both eyes every evening., Disp: , Rfl:     meclizine (ANTIVERT) 25 mg tablet, 25 mg 2 (two) times daily as needed.,  "Disp: , Rfl:     polyethylene glycol (GLYCOLAX) 17 gram PwPk, Take 17 g by mouth daily as needed. (Patient not taking: Reported on 8/14/2024), Disp: , Rfl:     Review of Systems   Constitutional:  Negative for chills, diaphoresis, fever and malaise/fatigue.   Respiratory:  Negative for cough and shortness of breath.    Cardiovascular:  Negative for chest pain, palpitations, orthopnea, leg swelling and PND.   Gastrointestinal:  Negative for abdominal pain, nausea and vomiting.   Musculoskeletal:  Negative for falls.   Neurological:  Negative for focal weakness and weakness.         Objective:   BP (!) 138/58 (BP Location: Left arm, Patient Position: Sitting)   Pulse (!) 58   Ht 5' 7" (1.702 m)   Wt 64.9 kg (143 lb)   SpO2 98%   BMI 22.40 kg/m²     Physical Exam  Constitutional:       General: She is not in acute distress.     Appearance: Normal appearance.   Cardiovascular:      Rate and Rhythm: Normal rate and regular rhythm.   Pulmonary:      Effort: Pulmonary effort is normal.      Breath sounds: Normal breath sounds.   Musculoskeletal:      Cervical back: Neck supple. No rigidity.      Right lower leg: No edema.      Left lower leg: No edema.   Skin:     General: Skin is warm and dry.   Neurological:      Mental Status: She is alert.           Assessment:     1. Essential hypertension        2. Intermittent palpitations        3. Exertional dyspnea              Plan:   Essential hypertension  Now well controlled on diltiazem CD 240mg daily    Intermittent palpitations  No significant arrhythmia noted on ZIO cardiac monitor    Exertional dyspnea  Has greatly improved  Echo is normal  NM stress test is normal      Follow-up with me in 6 months    "

## 2025-01-04 ENCOUNTER — HOSPITAL ENCOUNTER (EMERGENCY)
Facility: HOSPITAL | Age: 85
Discharge: HOME | End: 2025-01-04
Attending: INTERNAL MEDICINE
Payer: MEDICARE

## 2025-01-04 VITALS
SYSTOLIC BLOOD PRESSURE: 147 MMHG | BODY MASS INDEX: 20.88 KG/M2 | WEIGHT: 133 LBS | TEMPERATURE: 98 F | DIASTOLIC BLOOD PRESSURE: 76 MMHG | HEART RATE: 87 BPM | HEIGHT: 67 IN | OXYGEN SATURATION: 97 % | RESPIRATION RATE: 20 BRPM

## 2025-01-04 DIAGNOSIS — J20.9 ACUTE BRONCHITIS, UNSPECIFIED ORGANISM: Primary | ICD-10-CM

## 2025-01-04 DIAGNOSIS — R05.9 COUGH: ICD-10-CM

## 2025-01-04 LAB
ANION GAP SERPL CALCULATED.3IONS-SCNC: 17 MMOL/L (ref 7–16)
BASOPHILS # BLD AUTO: 0.03 K/UL (ref 0–0.2)
BASOPHILS NFR BLD AUTO: 0.4 % (ref 0–1)
BUN SERPL-MCNC: 9 MG/DL (ref 10–20)
BUN/CREAT SERPL: 12 (ref 6–20)
CALCIUM SERPL-MCNC: 10.3 MG/DL (ref 8.4–10.2)
CHLORIDE SERPL-SCNC: 105 MMOL/L (ref 98–107)
CO2 SERPL-SCNC: 22 MMOL/L (ref 23–31)
CREAT SERPL-MCNC: 0.78 MG/DL (ref 0.55–1.02)
DIFFERENTIAL METHOD BLD: ABNORMAL
EGFR (NO RACE VARIABLE) (RUSH/TITUS): 76 ML/MIN/1.73M2
EOSINOPHIL # BLD AUTO: 0.12 K/UL (ref 0–0.5)
EOSINOPHIL NFR BLD AUTO: 1.6 % (ref 1–4)
ERYTHROCYTE [DISTWIDTH] IN BLOOD BY AUTOMATED COUNT: 12.3 % (ref 11.5–14.5)
GLUCOSE SERPL-MCNC: 103 MG/DL (ref 82–115)
HCT VFR BLD AUTO: 38.6 % (ref 38–47)
HGB BLD-MCNC: 13.7 G/DL (ref 12–16)
IMM GRANULOCYTES # BLD AUTO: 0.01 K/UL (ref 0–0.04)
IMM GRANULOCYTES NFR BLD: 0.1 % (ref 0–0.4)
LYMPHOCYTES # BLD AUTO: 1.42 K/UL (ref 1–4.8)
LYMPHOCYTES NFR BLD AUTO: 19 % (ref 27–41)
MCH RBC QN AUTO: 31.1 PG (ref 27–31)
MCHC RBC AUTO-ENTMCNC: 35.5 G/DL (ref 32–36)
MCV RBC AUTO: 87.7 FL (ref 80–96)
MONOCYTES # BLD AUTO: 0.69 K/UL (ref 0–0.8)
MONOCYTES NFR BLD AUTO: 9.2 % (ref 2–6)
MPC BLD CALC-MCNC: 8.9 FL (ref 9.4–12.4)
NEUTROPHILS # BLD AUTO: 5.22 K/UL (ref 1.8–7.7)
NEUTROPHILS NFR BLD AUTO: 69.7 % (ref 53–65)
NRBC # BLD AUTO: 0 X10E3/UL
NRBC, AUTO (.00): 0 %
PLATELET # BLD AUTO: 231 K/UL (ref 150–400)
POTASSIUM SERPL-SCNC: 3.9 MMOL/L (ref 3.5–5.1)
RBC # BLD AUTO: 4.4 M/UL (ref 4.2–5.4)
SODIUM SERPL-SCNC: 140 MMOL/L (ref 136–145)
TROPONIN I SERPL HS-MCNC: <2.7 NG/L
WBC # BLD AUTO: 7.49 K/UL (ref 4.5–11)

## 2025-01-04 PROCEDURE — 80048 BASIC METABOLIC PNL TOTAL CA: CPT | Performed by: INTERNAL MEDICINE

## 2025-01-04 PROCEDURE — 85025 COMPLETE CBC W/AUTO DIFF WBC: CPT | Performed by: INTERNAL MEDICINE

## 2025-01-04 PROCEDURE — 63600175 PHARM REV CODE 636 W HCPCS: Performed by: INTERNAL MEDICINE

## 2025-01-04 PROCEDURE — 36415 COLL VENOUS BLD VENIPUNCTURE: CPT | Performed by: INTERNAL MEDICINE

## 2025-01-04 PROCEDURE — 84484 ASSAY OF TROPONIN QUANT: CPT | Performed by: INTERNAL MEDICINE

## 2025-01-04 PROCEDURE — 99284 EMERGENCY DEPT VISIT MOD MDM: CPT | Performed by: INTERNAL MEDICINE

## 2025-01-04 PROCEDURE — 99284 EMERGENCY DEPT VISIT MOD MDM: CPT | Mod: 25

## 2025-01-04 PROCEDURE — 96372 THER/PROPH/DIAG INJ SC/IM: CPT | Performed by: INTERNAL MEDICINE

## 2025-01-04 RX ORDER — DILTIAZEM HYDROCHLORIDE 240 MG/1
240 CAPSULE, EXTENDED RELEASE ORAL DAILY
COMMUNITY
Start: 2024-10-21

## 2025-01-04 RX ORDER — DEXAMETHASONE SODIUM PHOSPHATE 4 MG/ML
4 INJECTION, SOLUTION INTRA-ARTICULAR; INTRALESIONAL; INTRAMUSCULAR; INTRAVENOUS; SOFT TISSUE
Status: COMPLETED | OUTPATIENT
Start: 2025-01-04 | End: 2025-01-04

## 2025-01-04 RX ORDER — CEFTRIAXONE 1 G/1
1 INJECTION, POWDER, FOR SOLUTION INTRAMUSCULAR; INTRAVENOUS
Status: COMPLETED | OUTPATIENT
Start: 2025-01-04 | End: 2025-01-04

## 2025-01-04 RX ORDER — AMLODIPINE BESYLATE 2.5 MG/1
1 TABLET ORAL DAILY
COMMUNITY

## 2025-01-04 RX ORDER — AZITHROMYCIN 250 MG/1
250 TABLET, FILM COATED ORAL DAILY
Qty: 6 TABLET | Refills: 0 | Status: SHIPPED | OUTPATIENT
Start: 2025-01-04

## 2025-01-04 RX ADMIN — DEXAMETHASONE SODIUM PHOSPHATE 4 MG: 4 INJECTION, SOLUTION INTRA-ARTICULAR; INTRALESIONAL; INTRAMUSCULAR; INTRAVENOUS; SOFT TISSUE at 11:01

## 2025-01-04 RX ADMIN — CEFTRIAXONE 1 G: 1 INJECTION, POWDER, FOR SOLUTION INTRAMUSCULAR; INTRAVENOUS at 11:01

## 2025-01-04 NOTE — ED PROVIDER NOTES
Encounter Date: 1/4/2025       History     Chief Complaint   Patient presents with    Cough     Patient with a productive cough of yellow sputum for the last 2 days.  No fever chills.  No chest pain, angina PND and orthopnea.  No nausea vomiting abdominal pain.        Review of patient's allergies indicates:   Allergen Reactions    Aspirin Nausea And Vomiting    Codeine Nausea And Vomiting    Loratadine Nausea And Vomiting     Past Medical History:   Diagnosis Date    Acid reflux     Colorectal cancer 20 years ago    Hypertension     Unspecified glaucoma      Past Surgical History:   Procedure Laterality Date    COLON SURGERY      COLOSTOMY      COLOSTOMY      EYE SURGERY      HYSTERECTOMY       No family history on file.  Social History     Tobacco Use    Smoking status: Former     Types: Cigarettes    Smokeless tobacco: Never   Substance Use Topics    Alcohol use: Never    Drug use: Never     Review of Systems   Constitutional:  Negative for fever.   HENT:  Negative for sore throat.    Respiratory:  Negative for shortness of breath.    Cardiovascular:  Negative for chest pain.   Gastrointestinal:  Negative for nausea.   Genitourinary:  Negative for dysuria.   Musculoskeletal:  Negative for back pain.   Skin:  Negative for rash.   Neurological:  Negative for weakness.   Hematological:  Does not bruise/bleed easily.       Physical Exam     Initial Vitals [01/04/25 1101]   BP Pulse Resp Temp SpO2   (!) 147/76 87 20 97.7 °F (36.5 °C) 97 %      MAP       --         Physical Exam    Vitals reviewed.  Constitutional: She appears well-developed.   Eyes: Pupils are equal, round, and reactive to light.   Neck:   Normal range of motion.  Cardiovascular:  Normal rate, regular rhythm, normal heart sounds and normal pulses.           Pulmonary/Chest: Effort normal. She has rhonchi.   Abdominal: Abdomen is soft and flat. Bowel sounds are normal. There is no abdominal tenderness.   Musculoskeletal:         General: Normal range  of motion.      Cervical back: Normal range of motion.     Neurological: She is alert. She has normal strength. No cranial nerve deficit. GCS eye subscore is 4. GCS verbal subscore is 5. GCS motor subscore is 6.   Skin: Skin is warm.   Psychiatric: She has a normal mood and affect.         Medical Screening Exam   See Full Note    ED Course   Procedures  Labs Reviewed   BASIC METABOLIC PANEL - Abnormal       Result Value    Sodium 140      Potassium 3.9      Chloride 105      CO2 22 (*)     Anion Gap 17 (*)     Glucose 103      BUN 9 (*)     Creatinine 0.78      BUN/Creatinine Ratio 12      Calcium 10.3 (*)     eGFR 76     CBC WITH DIFFERENTIAL - Abnormal    WBC 7.49      RBC 4.40      Hemoglobin 13.7      Hematocrit 38.6      MCV 87.7      MCH 31.1 (*)     MCHC 35.5      RDW 12.3      Platelet Count 231      MPV 8.9 (*)     Neutrophils % 69.7 (*)     Lymphocytes % 19.0 (*)     Monocytes % 9.2 (*)     Eosinophils % 1.6      Basophils % 0.4      Immature Granulocytes % 0.1      nRBC, Auto 0.0      Neutrophils, Abs 5.22      Lymphocytes, Absolute 1.42      Monocytes, Absolute 0.69      Eosinophils, Absolute 0.12      Basophils, Absolute 0.03      Immature Granulocytes, Absolute 0.01      nRBC, Absolute 0.00      Diff Type Auto     TROPONIN I - Normal    Troponin I High Sensitivity <2.7     CBC W/ AUTO DIFFERENTIAL    Narrative:     The following orders were created for panel order CBC auto differential.  Procedure                               Abnormality         Status                     ---------                               -----------         ------                     CBC with Differential[[id number]]       Abnormal            Final result                 Please view results for these tests on the individual orders.          Imaging Results              X-Ray Chest PA And Lateral (Final result)  Result time 01/04/25 11:25:37      Final result by Laura Navarrete MD (01/04/25 11:25:37)                    Impression:      No acute abnormality.      Electronically signed by: Laura Navarrete  Date:    01/04/2025  Time:    11:25               Narrative:    EXAMINATION:  XR CHEST PA AND LATERAL    CLINICAL HISTORY:  Cough, unspecified    TECHNIQUE:  PA and lateral views of the chest were performed.    COMPARISON:  None    FINDINGS:  The lungs are clear, with normal appearance of pulmonary vasculature and no pleural effusion or pneumothorax.    The cardiac silhouette is normal in size. The hilar and mediastinal contours are unremarkable.    Bones are intact.                                       Medications   cefTRIAXone injection 1 g (has no administration in time range)   dexAMETHasone injection 4 mg (has no administration in time range)     Medical Decision Making  Patient is a productive cough with yellow sputum rule out pneumonia, bronchitis, cardiac ischemia, type back for a viral infection    Amount and/or Complexity of Data Reviewed  Labs: ordered. Decision-making details documented in ED Course.  Radiology: ordered. Decision-making details documented in ED Course.  Discussion of management or test interpretation with external provider(s): Chest x-ray cardiac workup is negative.  Will give Rocephin Decadron.  Was started on Zithromax.  Patient follow up with the primary care provider on Monday.    Risk  Prescription drug management.               ED Course as of 01/04/25 1152   Sat Jan 04, 2025   1131 X-Ray Chest PA And Lateral [PW]   1134 CBC auto differential(!) [PW]   1148 Basic metabolic panel(!) [PW]   1150 Troponin I High Sensitivity: <2.7 [PW]      ED Course User Index  [PW] Edilberto العراقي MD                           Clinical Impression:   Final diagnoses:  [R05.9] Cough  [J20.9] Acute bronchitis, unspecified organism (Primary)        ED Disposition Condition    Discharge Stable          ED Prescriptions       Medication Sig Dispense Start Date End Date Auth. Provider    azithromycin (Z-ANTHONY) 250 MG  tablet Take 1 tablet (250 mg total) by mouth once daily. Take first 2 tablets together, then 1 every day until finished. 6 tablet 1/4/2025 -- Edilberto العراقي MD          Follow-up Information       Follow up With Specialties Details Why Contact Info    Mara Jo MD Family Medicine On 1/6/2025  [address]  [facility]  [place] [id number]  [phone]               Edilberto العراقي MD  01/04/25 1150

## 2025-01-07 ENCOUNTER — PATIENT OUTREACH (OUTPATIENT)
Dept: EMERGENCY MEDICINE | Facility: HOSPITAL | Age: 85
End: 2025-01-07
Payer: MEDICARE

## 2025-01-07 NOTE — PROGRESS NOTES
1/7/25  ED Navigator Initial Assessment    ED Navigator Enrollment Documentation  Consent to Services  Does patient consent to completing the assessment?: Yes  Contact  Method of Initial Contact: Phone  Transportation  Does the patient have issues with Transportation?: No  Does the patient have transportation to and from healthcare appointments?: Yes  Insurance Coverage  Do you have coverage/adequate coverage?: Yes  Type/kind of coverage: Medicare  Is patient able to afford co-pays/deductibles?: Yes  Is patient able to afford HME or supplies?: Yes  Does patient have an established [facility] PCP?: Yes  Able to access?: Yes  Does the patient have a lack of adequate coverage?: No  Specialist Appointment  Did the patient come to the ED to see a specialist?: No  Does the patient have a pending specialist referral?: No  Does the patient have a specialist appointment made?: No  PCP Follow Up Appointment  Has the patient had an appointment with a primary care provider in the past year?: Yes  Approximate date: 8/19/24  Provider: Mara Jo MD  Does the patient have a follow up appontment with a PCP?: No  When was the last time you saw your PCP?: 8/19/24  Medications  Is patient able to afford medication?: Yes  Is patient unable to get medication due to lack of transportation?: No  Psychological  Does the patient have psycho-social concerns?: No  Food  Does the patient have concerns about food?: No  Communication/Education  Does the patient have limited English proficiency/English not primary language?: No  Does patient have low literacy and/or low health literacy?: No  Does patient have concerns with care?: No  Does patient have dissatisfaction with care?: No  Other Financial Concerns  Does the patient have immediate financial distress?: No  Does the patient have general financial concerns?: No  Other Social Barriers/Concerns  Does the patient have any additional barriers or concerns?: None  Primary Barrier  Barriers  "identified: Structural barrier (service availability, waiting times, etc.)  Root Cause of ED Utilization: Chronic Conditions  Plan to address Chronic Conditions: Encourage patient to contact PCP/specialist for follow up per ED discharge instructions  Next steps: Provided Education  Was education/educational materials provided surrounding PCP services/creating a medical home?: Yes Was education verbal or written?: Verbal     Was education/educational materials provided surrounding low cost, healthy foods?: No      Was education/educational materials provided surrounding other items? If so, use comment to explain.: No    Plan: Provided information for [facility]  Nurse triage line, [phone] or [phone] ([phone])  Expected Date of Follow Up 1: 25  Additional Documentation: Ed navigator outreach for enrollment to patient. Patient verified by spelling of first and last name with . Patient verified. Patient stated wasn't feeling up to a lot of talking but stated that I feel a little better I think the pill I got is giving me heartburn." Informed patient to call pcp Deysi patient stated she was out of the office. Informed patient that she could still contact the office and a provider there would help with any concerns. Encouraged to continue to rest push fluids and take medication if tolerable.Patient stated understanding and agreed to the education that was given. Informed patient the reason for the call and assured patient that if she has any concerns she can contact me at [phone]. Patient stated understanding      Ree Cavazos Lpn-Ed Navigator  Phone# [phone]  "

## 2025-01-22 ENCOUNTER — TELEPHONE (OUTPATIENT)
Dept: CARDIOLOGY | Facility: CLINIC | Age: 85
End: 2025-01-22
Payer: MEDICARE

## 2025-01-22 RX ORDER — DILTIAZEM HYDROCHLORIDE 240 MG/1
240 CAPSULE, COATED, EXTENDED RELEASE ORAL DAILY
Qty: 30 CAPSULE | Refills: 11 | Status: SHIPPED | OUTPATIENT
Start: 2025-01-22 | End: 2026-01-22

## 2025-01-22 NOTE — TELEPHONE ENCOUNTER
Spoke with the patient on today, had concerns as to why her BP meds were canceled, explained to the patient that we aren't sure as to what happened with her prescriptions and that we will get her meds back refilled.    Patient verbalized understanding. ----- Message from Tam sent at 1/22/2025  1:18 PM CST -----  Regarding: medication refill  Who Called: Yaritza Jiang    Refill or New Rx:New Rx  RX Name and Strength:TIADYLT  mg Cs24  How is the patient currently taking it? (ex. 1XDay):Take 240 mg by mouth once daily.  Is this a 30 day or 90 day RX:  Local or Mail Order:local  List of preferred pharmacies on file (remove unneeded): [unfilled]  If different Pharmacy is requested, enter Pharmacy information here including location and phone number: [facility] [place] - [address] [address] [address] [facility] [place] [address]  Phone: [phone] Fax: [phone]      Ordering Provider:Edilberto العراقي MD      Preferred Method of Contact: Phone Call  Patient's Preferred Phone Number on File: [phone]   Best Call Back Number, if different:  Additional Information: Pt called frustrated saying that we cancelled her blood pressure meds. I advised her that dr Kamara did not write the rx for that. She was insistent that she have these filled bc her blood pressure has been rising

## 2025-01-30 ENCOUNTER — TELEPHONE (OUTPATIENT)
Dept: CARDIOLOGY | Facility: CLINIC | Age: 85
End: 2025-01-30
Payer: MEDICARE

## 2025-01-30 NOTE — TELEPHONE ENCOUNTER
----- Message from Sandy sent at 1/22/2025  8:35 AM CST -----  Regarding: Rx request  Who Called: Yaritza Jiang    Refill or New Rx:Refill  RX Name and Strength: TIADYLT  mg Cs24 - - 10/21/2024 - Yes      How is the patient currently taking it:  Take 240 mg by mouth once daily. - Oral  Is this a 30 day or 90 day RX: N/A  Local or Mail Order: Local  List of preferred pharmacies on file: [facility] [place] - [facility] [address] [address] [address] [facility] [place] [address]  Phone: [phone] Fax: [phone]  Hours: Not open 24 hours        Preferred Method of Contact: Phone Call  Patient's Preferred Phone Number on File: [phone]     Additional Information: Pt. Initially had Rx given to her by the ER. She would like for the Rx to be sent to the pharmacy. She stated she is completely out and her BP has been going up. Please give pt. A courtesy call

## 2025-02-03 ENCOUNTER — HOSPITAL ENCOUNTER (EMERGENCY)
Facility: HOSPITAL | Age: 85
Discharge: ANOTHER HOSPITAL | End: 2025-02-04
Attending: EMERGENCY MEDICINE
Payer: MEDICARE

## 2025-02-03 DIAGNOSIS — R11.2 NAUSEA AND VOMITING, UNSPECIFIED VOMITING TYPE: ICD-10-CM

## 2025-02-03 DIAGNOSIS — K56.609 SMALL BOWEL OBSTRUCTION: Primary | ICD-10-CM

## 2025-02-03 DIAGNOSIS — N39.0 BACTERIAL URINARY INFECTION: ICD-10-CM

## 2025-02-03 DIAGNOSIS — Z01.89 ENCOUNTER FOR IMAGING STUDY TO CONFIRM NASOGASTRIC (NG) TUBE PLACEMENT: ICD-10-CM

## 2025-02-03 DIAGNOSIS — A49.9 BACTERIAL URINARY INFECTION: ICD-10-CM

## 2025-02-03 LAB
ALBUMIN SERPL BCP-MCNC: 3.9 G/DL (ref 3.4–4.8)
ALBUMIN/GLOB SERPL: 1.3 {RATIO}
ALP SERPL-CCNC: 97 U/L (ref 40–150)
ALT SERPL W P-5'-P-CCNC: 15 U/L
ANION GAP SERPL CALCULATED.3IONS-SCNC: 16 MMOL/L (ref 7–16)
AST SERPL W P-5'-P-CCNC: 21 U/L (ref 5–34)
BACTERIA #/AREA URNS HPF: ABNORMAL /HPF
BASOPHILS # BLD AUTO: 0.01 K/UL (ref 0–0.2)
BASOPHILS NFR BLD AUTO: 0.2 % (ref 0–1)
BILIRUB SERPL-MCNC: 0.8 MG/DL
BILIRUB UR QL STRIP: NEGATIVE
BUN SERPL-MCNC: 20 MG/DL (ref 10–20)
BUN/CREAT SERPL: 29 (ref 6–20)
CALCIUM SERPL-MCNC: 10.3 MG/DL (ref 8.4–10.2)
CHLORIDE SERPL-SCNC: 100 MMOL/L (ref 98–107)
CLARITY UR: CLEAR
CO2 SERPL-SCNC: 26 MMOL/L (ref 23–31)
COARSE GRAN CASTS #/AREA URNS LPF: ABNORMAL /LPF
COLOR UR: YELLOW
CREAT SERPL-MCNC: 0.69 MG/DL (ref 0.55–1.02)
DIFFERENTIAL METHOD BLD: ABNORMAL
EGFR (NO RACE VARIABLE) (RUSH/TITUS): 87 ML/MIN/1.73M2
EOSINOPHIL # BLD AUTO: 0.01 K/UL (ref 0–0.5)
EOSINOPHIL NFR BLD AUTO: 0.2 % (ref 1–4)
ERYTHROCYTE [DISTWIDTH] IN BLOOD BY AUTOMATED COUNT: 12.8 % (ref 11.5–14.5)
FINE GRAN CASTS #/AREA URNS LPF: ABNORMAL /LPF
GLOBULIN SER-MCNC: 3 G/DL (ref 2–4)
GLUCOSE SERPL-MCNC: 135 MG/DL (ref 82–115)
GLUCOSE UR STRIP-MCNC: NEGATIVE MG/DL
HCT VFR BLD AUTO: 39 % (ref 38–47)
HGB BLD-MCNC: 13.9 G/DL (ref 12–16)
IMM GRANULOCYTES # BLD AUTO: 0.02 K/UL (ref 0–0.04)
IMM GRANULOCYTES NFR BLD: 0.3 % (ref 0–0.4)
KETONES UR STRIP-SCNC: NEGATIVE MG/DL
LEUKOCYTE ESTERASE UR QL STRIP: NEGATIVE
LIPASE SERPL-CCNC: 14 U/L
LYMPHOCYTES # BLD AUTO: 0.72 K/UL (ref 1–4.8)
LYMPHOCYTES NFR BLD AUTO: 10.8 % (ref 27–41)
MAGNESIUM SERPL-MCNC: 2 MG/DL (ref 1.6–2.6)
MCH RBC QN AUTO: 31.3 PG (ref 27–31)
MCHC RBC AUTO-ENTMCNC: 35.6 G/DL (ref 32–36)
MCV RBC AUTO: 87.8 FL (ref 80–96)
MONOCYTES # BLD AUTO: 0.64 K/UL (ref 0–0.8)
MONOCYTES NFR BLD AUTO: 9.6 % (ref 2–6)
MPC BLD CALC-MCNC: 9.2 FL (ref 9.4–12.4)
MUCOUS THREADS #/AREA URNS HPF: ABNORMAL /HPF
NEUTROPHILS # BLD AUTO: 5.26 K/UL (ref 1.8–7.7)
NEUTROPHILS NFR BLD AUTO: 78.9 % (ref 53–65)
NITRITE UR QL STRIP: NEGATIVE
NRBC # BLD AUTO: 0 X10E3/UL
NRBC, AUTO (.00): 0 %
PH UR STRIP: 7 PH UNITS
PLATELET # BLD AUTO: 240 K/UL (ref 150–400)
POTASSIUM SERPL-SCNC: 3.7 MMOL/L (ref 3.5–5.1)
PROT SERPL-MCNC: 6.9 G/DL (ref 5.8–7.6)
PROT UR QL STRIP: 30
RBC # BLD AUTO: 4.44 M/UL (ref 4.2–5.4)
RBC # UR STRIP: NEGATIVE /UL
RBC #/AREA URNS HPF: ABNORMAL /HPF
SODIUM SERPL-SCNC: 138 MMOL/L (ref 136–145)
SP GR UR STRIP: 1.02
SQUAMOUS #/AREA URNS LPF: ABNORMAL /LPF
UROBILINOGEN UR STRIP-ACNC: 1 MG/DL
WBC # BLD AUTO: 6.66 K/UL (ref 4.5–11)
WBC #/AREA URNS HPF: ABNORMAL /HPF

## 2025-02-03 PROCEDURE — 85025 COMPLETE CBC W/AUTO DIFF WBC: CPT | Performed by: EMERGENCY MEDICINE

## 2025-02-03 PROCEDURE — 25000003 PHARM REV CODE 250: Performed by: EMERGENCY MEDICINE

## 2025-02-03 PROCEDURE — 83735 ASSAY OF MAGNESIUM: CPT | Performed by: EMERGENCY MEDICINE

## 2025-02-03 PROCEDURE — 96361 HYDRATE IV INFUSION ADD-ON: CPT

## 2025-02-03 PROCEDURE — 63600175 PHARM REV CODE 636 W HCPCS: Performed by: EMERGENCY MEDICINE

## 2025-02-03 PROCEDURE — 25500020 PHARM REV CODE 255: Performed by: EMERGENCY MEDICINE

## 2025-02-03 PROCEDURE — 96375 TX/PRO/DX INJ NEW DRUG ADDON: CPT

## 2025-02-03 PROCEDURE — 81003 URINALYSIS AUTO W/O SCOPE: CPT | Performed by: EMERGENCY MEDICINE

## 2025-02-03 PROCEDURE — 99285 EMERGENCY DEPT VISIT HI MDM: CPT | Performed by: EMERGENCY MEDICINE

## 2025-02-03 PROCEDURE — 99285 EMERGENCY DEPT VISIT HI MDM: CPT | Mod: 25

## 2025-02-03 PROCEDURE — 80053 COMPREHEN METABOLIC PANEL: CPT | Performed by: EMERGENCY MEDICINE

## 2025-02-03 PROCEDURE — 83690 ASSAY OF LIPASE: CPT | Performed by: EMERGENCY MEDICINE

## 2025-02-03 RX ORDER — PROCHLORPERAZINE EDISYLATE 5 MG/ML
10 INJECTION INTRAMUSCULAR; INTRAVENOUS
Status: COMPLETED | OUTPATIENT
Start: 2025-02-03 | End: 2025-02-03

## 2025-02-03 RX ORDER — DIATRIZOATE MEGLUMINE AND DIATRIZOATE SODIUM 660; 100 MG/ML; MG/ML
30 SOLUTION ORAL; RECTAL
Status: COMPLETED | OUTPATIENT
Start: 2025-02-03 | End: 2025-02-03

## 2025-02-03 RX ORDER — CEFTRIAXONE 1 G/1
1 INJECTION, POWDER, FOR SOLUTION INTRAMUSCULAR; INTRAVENOUS
Status: COMPLETED | OUTPATIENT
Start: 2025-02-03 | End: 2025-02-03

## 2025-02-03 RX ORDER — IOPAMIDOL 755 MG/ML
100 INJECTION, SOLUTION INTRAVASCULAR
Status: COMPLETED | OUTPATIENT
Start: 2025-02-03 | End: 2025-02-03

## 2025-02-03 RX ADMIN — PROCHLORPERAZINE EDISYLATE 10 MG: 5 INJECTION INTRAMUSCULAR; INTRAVENOUS at 10:02

## 2025-02-03 RX ADMIN — CEFTRIAXONE 1 G: 1 INJECTION, POWDER, FOR SOLUTION INTRAMUSCULAR; INTRAVENOUS at 11:02

## 2025-02-03 RX ADMIN — SODIUM CHLORIDE 1000 ML: 9 INJECTION, SOLUTION INTRAVENOUS at 11:02

## 2025-02-03 RX ADMIN — IOPAMIDOL 100 ML: 755 INJECTION, SOLUTION INTRAVENOUS at 11:02

## 2025-02-03 RX ADMIN — DIATRIZOATE MEGLUMINE AND DIATRIZOATE SODIUM 30 ML: 600; 100 SOLUTION ORAL; RECTAL at 10:02

## 2025-02-04 VITALS
OXYGEN SATURATION: 96 % | TEMPERATURE: 98 F | DIASTOLIC BLOOD PRESSURE: 58 MMHG | HEART RATE: 81 BPM | SYSTOLIC BLOOD PRESSURE: 144 MMHG | WEIGHT: 137 LBS | RESPIRATION RATE: 18 BRPM | BODY MASS INDEX: 21.5 KG/M2 | HEIGHT: 67 IN

## 2025-02-04 PROCEDURE — 63600175 PHARM REV CODE 636 W HCPCS: Performed by: EMERGENCY MEDICINE

## 2025-02-04 PROCEDURE — 96365 THER/PROPH/DIAG IV INF INIT: CPT

## 2025-02-04 PROCEDURE — 25000003 PHARM REV CODE 250: Performed by: EMERGENCY MEDICINE

## 2025-02-04 PROCEDURE — 96375 TX/PRO/DX INJ NEW DRUG ADDON: CPT

## 2025-02-04 RX ORDER — METRONIDAZOLE 500 MG/100ML
500 INJECTION, SOLUTION INTRAVENOUS
Status: COMPLETED | OUTPATIENT
Start: 2025-02-04 | End: 2025-02-04

## 2025-02-04 RX ORDER — SODIUM CHLORIDE, SODIUM LACTATE, POTASSIUM CHLORIDE, CALCIUM CHLORIDE 600; 310; 30; 20 MG/100ML; MG/100ML; MG/100ML; MG/100ML
INJECTION, SOLUTION INTRAVENOUS CONTINUOUS
Status: DISCONTINUED | OUTPATIENT
Start: 2025-02-04 | End: 2025-02-04 | Stop reason: HOSPADM

## 2025-02-04 RX ORDER — LIDOCAINE HYDROCHLORIDE 20 MG/ML
5 SOLUTION OROPHARYNGEAL
Status: COMPLETED | OUTPATIENT
Start: 2025-02-04 | End: 2025-02-04

## 2025-02-04 RX ORDER — ONDANSETRON HYDROCHLORIDE 2 MG/ML
4 INJECTION, SOLUTION INTRAVENOUS
Status: COMPLETED | OUTPATIENT
Start: 2025-02-04 | End: 2025-02-04

## 2025-02-04 RX ORDER — SODIUM CHLORIDE, SODIUM LACTATE, POTASSIUM CHLORIDE, CALCIUM CHLORIDE 600; 310; 30; 20 MG/100ML; MG/100ML; MG/100ML; MG/100ML
INJECTION, SOLUTION INTRAVENOUS
Status: DISCONTINUED
Start: 2025-02-04 | End: 2025-02-04 | Stop reason: HOSPADM

## 2025-02-04 RX ADMIN — SODIUM CHLORIDE, SODIUM LACTATE, POTASSIUM CHLORIDE, AND CALCIUM CHLORIDE: .6; .31; .03; .02 INJECTION, SOLUTION INTRAVENOUS at 07:02

## 2025-02-04 RX ADMIN — METRONIDAZOLE 500 MG: 5 INJECTION, SOLUTION INTRAVENOUS at 01:02

## 2025-02-04 RX ADMIN — ONDANSETRON 4 MG: 2 INJECTION INTRAMUSCULAR; INTRAVENOUS at 12:02

## 2025-02-04 RX ADMIN — LIDOCAINE HYDROCHLORIDE 5 ML: 20 SOLUTION ORAL at 01:02

## 2025-02-04 NOTE — ED PROVIDER NOTES
Encounter Date: 2/3/2025       History     Chief Complaint   Patient presents with    Abdominal Pain    Nausea     Patient comes in with complaints of intermittent abdominal pain , states that it is cramping. Reports vomiting yesterday. Nausea today.     Patient presents with report of abdominal cramping pain and nausea and vomiting.  Symptoms started yesterday evening.  States she ate [facility] [employer], had some dressing and felt fine.  Later in the evening she felt nauseated and did not eat supper, vomited what she describes as a very large amount at about 7:00 p.m. last night.  Has had some retching today but no significant vomiting.  Feels nauseated, and gets intermittent abdominal cramping.  Has a colostomy from proctocolectomy for colon malignancy 25 years ago.  She states that her colostomy bag has been filling normally.  No appetite today.      Review of patient's allergies indicates:   Allergen Reactions    Aspirin Nausea And Vomiting    Codeine Nausea And Vomiting    Loratadine Nausea And Vomiting     Past Medical History:   Diagnosis Date    Acid reflux     Colorectal cancer 20 years ago    Hypertension     Unspecified glaucoma      Past Surgical History:   Procedure Laterality Date    COLON SURGERY      COLOSTOMY      COLOSTOMY      EYE SURGERY      HYSTERECTOMY       No family history on file.  Social History     Tobacco Use    Smoking status: Former     Types: Cigarettes    Smokeless tobacco: Never   Substance Use Topics    Alcohol use: Never    Drug use: Never     Review of Systems   Constitutional:  Positive for appetite change (poor appetite since last night). Negative for activity change, fatigue and fever.   HENT: Negative.     Eyes: Negative.    Respiratory: Negative.  Negative for shortness of breath.    Cardiovascular: Negative.  Negative for chest pain.   Gastrointestinal:  Positive for abdominal pain, nausea and vomiting. Negative for abdominal distention, blood in stool, constipation and  diarrhea.   Genitourinary: Negative.    Musculoskeletal: Negative.    Skin: Negative.    Neurological: Negative.    Psychiatric/Behavioral: Negative.     All other systems reviewed and are negative.      Physical Exam     Initial Vitals [02/03/25 2126]   BP Pulse Resp Temp SpO2   (!) 155/66 70 19 98.1 °F (36.7 °C) 97 %      MAP       --         Physical Exam    Nursing note and vitals reviewed.  Constitutional: She appears well-developed and well-nourished. No distress.   HENT:   Right Ear: External ear normal.   Left Ear: External ear normal.   Nose: Nose normal. Mouth/Throat: Mucous membranes are dry.   Eyes: Conjunctivae and EOM are normal. Pupils are equal, round, and reactive to light. No scleral icterus.   Neck: Neck supple. No JVD present.   Normal range of motion.  Cardiovascular:  Normal rate, regular rhythm, normal heart sounds and intact distal pulses.           No murmur heard.  Pulmonary/Chest: Breath sounds normal. No stridor. No respiratory distress. She has no wheezes. She has no rhonchi. She has no rales.   Abdominal: Abdomen is soft. Bowel sounds are normal. She exhibits no distension. There is abdominal tenderness (patient has diffuse abdominal tenderness with voluntary guarding diffusely as well.). There is no rebound.   Musculoskeletal:         General: No tenderness or edema. Normal range of motion.      Cervical back: Normal range of motion and neck supple.     Lymphadenopathy:     She has no cervical adenopathy.   Neurological: She is alert and oriented to person, place, and time. She has normal strength. No cranial nerve deficit. GCS score is 15. GCS eye subscore is 4. GCS verbal subscore is 5. GCS motor subscore is 6.   Skin: Skin is warm and dry. Capillary refill takes 2 to 3 seconds. No rash noted. No erythema. No pallor.   Psychiatric: She has a normal mood and affect. Her behavior is normal.         Medical Screening Exam   See Full Note    ED Course   Procedures  Labs Reviewed    COMPREHENSIVE METABOLIC PANEL - Abnormal       Result Value    Sodium 138      Potassium 3.7      Chloride 100      CO2 26      Anion Gap 16      Glucose 135 (*)     BUN 20      Creatinine 0.69      BUN/Creatinine Ratio 29 (*)     Calcium 10.3 (*)     Total Protein 6.9      Albumin 3.9      Globulin 3.0      A/G Ratio 1.3      Bilirubin, Total 0.8      Alk Phos 97      ALT 15      AST 21      eGFR 87     URINALYSIS, REFLEX TO URINE CULTURE - Abnormal    Color, UA Yellow      Clarity, UA Clear      pH, UA 7.0      Leukocytes, UA Negative      Nitrites, UA Negative      Protein, UA 30 (*)     Glucose, UA Negative      Ketones, UA Negative      Urobilinogen, UA 1.0      Bilirubin, UA Negative      Blood, UA Negative      Specific Gravity, UA 1.020     CBC WITH DIFFERENTIAL - Abnormal    WBC 6.66      RBC 4.44      Hemoglobin 13.9      Hematocrit 39.0      MCV 87.8      MCH 31.3 (*)     MCHC 35.6      RDW 12.8      Platelet Count 240      MPV 9.2 (*)     Neutrophils % 78.9 (*)     Lymphocytes % 10.8 (*)     Monocytes % 9.6 (*)     Eosinophils % 0.2 (*)     Basophils % 0.2      Immature Granulocytes % 0.3      nRBC, Auto 0.0      Neutrophils, Abs 5.26      Lymphocytes, Absolute 0.72 (*)     Monocytes, Absolute 0.64      Eosinophils, Absolute 0.01      Basophils, Absolute 0.01      Immature Granulocytes, Absolute 0.02      nRBC, Absolute 0.00      Diff Type Auto     URINALYSIS, MICROSCOPIC - Abnormal    WBC, UA 0-5      RBC, UA 0-3      Bacteria, UA Few (*)     Squamous Epithelial Cells, UA Rare      Mucus, UA Rare (*)     Fine Granular Casts, UA 0-2 (*)     Coarse Granular Casts, UA 0-2 (*)    LIPASE - Normal    Lipase 14     MAGNESIUM - Normal    Magnesium 2.0     CBC W/ AUTO DIFFERENTIAL    Narrative:     The following orders were created for panel order CBC auto differential.  Procedure                               Abnormality         Status                     ---------                               -----------          ------                     CBC with Differential[[id number]]       Abnormal            Final result                 Please view results for these tests on the individual orders.          Imaging Results              CT Abdomen Pelvis With IV Contrast Routine Oral Contrast (In process)                      Medications   metronidazole IVPB 500 mg (has no administration in time range)   prochlorperazine injection Soln 10 mg (10 mg Intravenous Given 2/3/25 2208)   sodium chloride 0.9% bolus 1,000 mL 1,000 mL ( Intravenous Stopped 2/4/25 0031)   diatrizoate meglumineand-diatrizoate sodium (GASTROVIEW) solution 30 mL (30 mLs Oral Given 2/3/25 2235)   iopamidoL (ISOVUE-370) injection 100 mL (100 mLs Intravenous Given 2/3/25 2315)   cefTRIAXone injection 1 g (1 g Intravenous Given 2/3/25 2341)   ondansetron injection 4 mg (4 mg Intravenous Given 2/4/25 0030)   LIDOcaine viscous HCl 2% oral solution 5 mL (5 mLs Oral Given 2/4/25 0122)     Medical Decision Making  Differential diagnosis includes viral gastroenteritis, bowel obstruction, ileus, colitis, diverticulitis, enteritis.  Urinalysis is consistent with urinary infection.  Patient treated with IV Rocephin.  CT scan of the abdomen and pelvis is consistent with high-grade small-bowel obstruction.  Patient has already been given IV Rocephin for urinary infection, patient was also given IV Flagyl after small-bowel obstruction noted on CT scan.  NG-tube placed.  Recommend transfer to higher level of care for surgery evaluation.        Amount and/or Complexity of Data Reviewed  Labs: ordered. Decision-making details documented in ED Course.  Radiology: ordered. Decision-making details documented in ED Course.    Risk  Prescription drug management.               ED Course as of 02/04/25 0135   Mon Feb 03, 2025 2237 CBC auto differential(!)  CBC shows normal white blood cell count, normal hemoglobin and hematocrit, normal platelet count.  78% neutrophils, 10%  lymphocytes. [LM]   2237 Urinalysis, Reflex to Urine Culture(!)  Urinalysis shows 30 mg/dL of proteinuria. [LM]   2238 Urinalysis, Microscopic(!)  Microscopic urinalysis shows 0-5 WBCs and 0-3 RBCs with few bacteria.  There is 0-2 fine granular casts and 0 2 to coarse granular casts. [LM]   2257 Magnesium  Magnesium level is normal [LM]   2257 Lipase  Lipase is normal [LM]   2257 Comprehensive metabolic panel(!)  CMP shows glucose 135, BUN is 20 with creatinine 0.69, both normal.  BUN creatinine ratio was elevated at 29, estimated GFR is 87.  Calcium level is borderline elevated at 10.3. [LM]   Tue Feb 04, 2025   0108 CT Abdomen Pelvis With IV Contrast Routine Oral Contrast  Review of radiologist's report for CT scan abdomen and pelvis shows high-grade small-bowel obstruction with transition point suspected in the right pelvis. [LM]      ED Course User Index  [LM] Arsh Merrill DO                             Clinical Impression:   Final diagnoses:  [R11.2] Nausea and vomiting, unspecified vomiting type  [N39.0, A49.9] Bacterial urinary infection  [K56.609] Small bowel obstruction (Primary)        ED Disposition Condition    Transfer to Another Facility Serious                Arsh Merrill DO  02/04/25 2437

## 2025-02-04 NOTE — ED NOTES
Lactated ringers started at 100 cc/hr on dial a flow - report to giuliano - pt prepared for transfer

## 2025-02-04 NOTE — ED NOTES
Patient vomiting contrast dye. Dr Merrill notified , states to have radiology scan without, Patient vomited 50 ml light brown liquid

## 2025-02-07 NOTE — ADDENDUM NOTE
Encounter addended by: Terra Ram on: 2/7/2025 12:14 PM   Actions taken: SmartForm saved, Flowsheet accepted, Charge Capture section accepted

## 2025-02-11 NOTE — ADDENDUM NOTE
Encounter addended by: Jammie Camejo on: 2/11/2025 9:22 AM   Actions taken: Flowsheet accepted, Charge Capture section accepted

## 2025-03-14 ENCOUNTER — HOSPITAL ENCOUNTER (EMERGENCY)
Facility: HOSPITAL | Age: 85
Discharge: ANOTHER HOSPITAL | End: 2025-03-15
Attending: SPECIALIST
Payer: MEDICARE

## 2025-03-14 DIAGNOSIS — K56.609 SMALL BOWEL OBSTRUCTION: Primary | ICD-10-CM

## 2025-03-14 DIAGNOSIS — E86.1 HYPOVOLEMIA: ICD-10-CM

## 2025-03-14 DIAGNOSIS — R11.2 NAUSEA AND VOMITING, UNSPECIFIED VOMITING TYPE: ICD-10-CM

## 2025-03-14 DIAGNOSIS — R42 DIZZINESS: ICD-10-CM

## 2025-03-14 LAB
ALBUMIN SERPL BCP-MCNC: 4.6 G/DL (ref 3.4–4.8)
ALBUMIN/GLOB SERPL: 1.4 {RATIO}
ALP SERPL-CCNC: 120 U/L (ref 40–150)
ALT SERPL W P-5'-P-CCNC: 14 U/L
ANION GAP SERPL CALCULATED.3IONS-SCNC: 25 MMOL/L (ref 7–16)
APTT PPP: 25.7 SECONDS (ref 25.2–37.3)
AST SERPL W P-5'-P-CCNC: 41 U/L (ref 11–45)
BASOPHILS # BLD AUTO: 0.03 K/UL (ref 0–0.2)
BASOPHILS NFR BLD AUTO: 0.3 % (ref 0–1)
BILIRUB SERPL-MCNC: 0.5 MG/DL
BILIRUB UR QL STRIP: NEGATIVE
BUN SERPL-MCNC: 13 MG/DL (ref 10–20)
BUN/CREAT SERPL: 13 (ref 6–20)
CALCIUM SERPL-MCNC: 11.4 MG/DL (ref 8.4–10.2)
CHLORIDE SERPL-SCNC: 96 MMOL/L (ref 98–107)
CLARITY UR: CLEAR
CO2 SERPL-SCNC: 19 MMOL/L (ref 23–31)
COLOR UR: YELLOW
CREAT SERPL-MCNC: 1.03 MG/DL (ref 0.55–1.02)
DIFFERENTIAL METHOD BLD: ABNORMAL
EGFR (NO RACE VARIABLE) (RUSH/TITUS): 54 ML/MIN/1.73M2
EOSINOPHIL # BLD AUTO: 0.01 K/UL (ref 0–0.5)
EOSINOPHIL NFR BLD AUTO: 0.1 % (ref 1–4)
ERYTHROCYTE [DISTWIDTH] IN BLOOD BY AUTOMATED COUNT: 12.5 % (ref 11.5–14.5)
GLOBULIN SER-MCNC: 3.4 G/DL (ref 2–4)
GLUCOSE SERPL-MCNC: 191 MG/DL (ref 82–115)
GLUCOSE UR STRIP-MCNC: NEGATIVE MG/DL
HCT VFR BLD AUTO: 43.3 % (ref 38–47)
HGB BLD-MCNC: 15.5 G/DL (ref 12–16)
IMM GRANULOCYTES # BLD AUTO: 0.04 K/UL (ref 0–0.04)
IMM GRANULOCYTES NFR BLD: 0.3 % (ref 0–0.4)
INFLUENZA A MOLECULAR (OHS): NEGATIVE
INFLUENZA B MOLECULAR (OHS): NEGATIVE
INR BLD: 0.97
KETONES UR STRIP-SCNC: 15 MG/DL
LEUKOCYTE ESTERASE UR QL STRIP: NEGATIVE
LIPASE SERPL-CCNC: 22 U/L
LYMPHOCYTES # BLD AUTO: 1.14 K/UL (ref 1–4.8)
LYMPHOCYTES NFR BLD AUTO: 9.9 % (ref 27–41)
MAGNESIUM SERPL-MCNC: 2.2 MG/DL (ref 1.6–2.6)
MCH RBC QN AUTO: 30.9 PG (ref 27–31)
MCHC RBC AUTO-ENTMCNC: 35.8 G/DL (ref 32–36)
MCV RBC AUTO: 86.3 FL (ref 80–96)
MONOCYTES # BLD AUTO: 0.23 K/UL (ref 0–0.8)
MONOCYTES NFR BLD AUTO: 2 % (ref 2–6)
MPC BLD CALC-MCNC: 9.2 FL (ref 9.4–12.4)
NEUTROPHILS # BLD AUTO: 10.04 K/UL (ref 1.8–7.7)
NEUTROPHILS NFR BLD AUTO: 87.4 % (ref 53–65)
NITRITE UR QL STRIP: NEGATIVE
NRBC # BLD AUTO: 0 X10E3/UL
NRBC, AUTO (.00): 0 %
PH UR STRIP: 7.5 PH UNITS
PLATELET # BLD AUTO: 274 K/UL (ref 150–400)
POTASSIUM SERPL-SCNC: 3.5 MMOL/L (ref 3.5–5.1)
PROT SERPL-MCNC: 8 G/DL (ref 5.8–7.6)
PROT UR QL STRIP: NEGATIVE
PROTHROMBIN TIME: 12.8 SECONDS (ref 11.7–14.7)
RBC # BLD AUTO: 5.02 M/UL (ref 4.2–5.4)
RBC # UR STRIP: NEGATIVE /UL
SARS-COV-2 RDRP RESP QL NAA+PROBE: NEGATIVE
SODIUM SERPL-SCNC: 136 MMOL/L (ref 136–145)
SP GR UR STRIP: 1.02
TROPONIN I SERPL HS-MCNC: 18.9 NG/L
TROPONIN I SERPL HS-MCNC: 33.5 NG/L
UROBILINOGEN UR STRIP-ACNC: 0.2 MG/DL
WBC # BLD AUTO: 11.49 K/UL (ref 4.5–11)

## 2025-03-14 PROCEDURE — 99285 EMERGENCY DEPT VISIT HI MDM: CPT | Mod: 25

## 2025-03-14 PROCEDURE — 96375 TX/PRO/DX INJ NEW DRUG ADDON: CPT

## 2025-03-14 PROCEDURE — 83690 ASSAY OF LIPASE: CPT | Performed by: SPECIALIST

## 2025-03-14 PROCEDURE — 87502 INFLUENZA DNA AMP PROBE: CPT | Performed by: SPECIALIST

## 2025-03-14 PROCEDURE — 93010 ELECTROCARDIOGRAM REPORT: CPT | Mod: ,,, | Performed by: INTERNAL MEDICINE

## 2025-03-14 PROCEDURE — 93005 ELECTROCARDIOGRAM TRACING: CPT

## 2025-03-14 PROCEDURE — 83735 ASSAY OF MAGNESIUM: CPT | Performed by: SPECIALIST

## 2025-03-14 PROCEDURE — 96365 THER/PROPH/DIAG IV INF INIT: CPT

## 2025-03-14 PROCEDURE — 25000003 PHARM REV CODE 250: Performed by: SPECIALIST

## 2025-03-14 PROCEDURE — 99285 EMERGENCY DEPT VISIT HI MDM: CPT | Performed by: SPECIALIST

## 2025-03-14 PROCEDURE — 63600175 PHARM REV CODE 636 W HCPCS: Performed by: SPECIALIST

## 2025-03-14 PROCEDURE — 85610 PROTHROMBIN TIME: CPT | Performed by: SPECIALIST

## 2025-03-14 PROCEDURE — 87428 SARSCOV & INF VIR A&B AG IA: CPT | Performed by: SPECIALIST

## 2025-03-14 PROCEDURE — 25500020 PHARM REV CODE 255: Performed by: SPECIALIST

## 2025-03-14 PROCEDURE — 85730 THROMBOPLASTIN TIME PARTIAL: CPT | Performed by: SPECIALIST

## 2025-03-14 PROCEDURE — 80053 COMPREHEN METABOLIC PANEL: CPT | Performed by: SPECIALIST

## 2025-03-14 PROCEDURE — 81003 URINALYSIS AUTO W/O SCOPE: CPT | Performed by: SPECIALIST

## 2025-03-14 PROCEDURE — 85025 COMPLETE CBC W/AUTO DIFF WBC: CPT | Performed by: SPECIALIST

## 2025-03-14 PROCEDURE — 36415 COLL VENOUS BLD VENIPUNCTURE: CPT | Performed by: SPECIALIST

## 2025-03-14 PROCEDURE — 87635 SARS-COV-2 COVID-19 AMP PRB: CPT | Performed by: SPECIALIST

## 2025-03-14 PROCEDURE — 84484 ASSAY OF TROPONIN QUANT: CPT | Performed by: SPECIALIST

## 2025-03-14 RX ORDER — HEPARIN SODIUM,PORCINE/D5W 25000/250
0-40 INTRAVENOUS SOLUTION INTRAVENOUS CONTINUOUS
Status: DISCONTINUED | OUTPATIENT
Start: 2025-03-14 | End: 2025-03-15 | Stop reason: HOSPADM

## 2025-03-14 RX ORDER — PROCHLORPERAZINE MALEATE 5 MG
10 TABLET ORAL
Status: DISCONTINUED | OUTPATIENT
Start: 2025-03-14 | End: 2025-03-14

## 2025-03-14 RX ORDER — PROCHLORPERAZINE EDISYLATE 5 MG/ML
10 INJECTION INTRAMUSCULAR; INTRAVENOUS
Status: COMPLETED | OUTPATIENT
Start: 2025-03-14 | End: 2025-03-14

## 2025-03-14 RX ORDER — SODIUM CHLORIDE 9 MG/ML
1000 INJECTION, SOLUTION INTRAVENOUS
Status: COMPLETED | OUTPATIENT
Start: 2025-03-14 | End: 2025-03-14

## 2025-03-14 RX ORDER — IOPAMIDOL 755 MG/ML
100 INJECTION, SOLUTION INTRAVASCULAR
Status: COMPLETED | OUTPATIENT
Start: 2025-03-14 | End: 2025-03-14

## 2025-03-14 RX ORDER — PANTOPRAZOLE SODIUM 40 MG/10ML
40 INJECTION, POWDER, LYOPHILIZED, FOR SOLUTION INTRAVENOUS
Status: COMPLETED | OUTPATIENT
Start: 2025-03-14 | End: 2025-03-14

## 2025-03-14 RX ORDER — ONDANSETRON HYDROCHLORIDE 2 MG/ML
4 INJECTION, SOLUTION INTRAVENOUS
Status: COMPLETED | OUTPATIENT
Start: 2025-03-14 | End: 2025-03-14

## 2025-03-14 RX ADMIN — SODIUM CHLORIDE 1000 ML: 9 INJECTION, SOLUTION INTRAVENOUS at 11:03

## 2025-03-14 RX ADMIN — HEPARIN SODIUM 12 UNITS/KG/HR: 10000 INJECTION, SOLUTION INTRAVENOUS at 11:03

## 2025-03-14 RX ADMIN — PANTOPRAZOLE SODIUM 40 MG: 40 INJECTION, POWDER, FOR SOLUTION INTRAVENOUS at 11:03

## 2025-03-14 RX ADMIN — IOPAMIDOL 80 ML: 755 INJECTION, SOLUTION INTRAVENOUS at 11:03

## 2025-03-14 RX ADMIN — ONDANSETRON 4 MG: 2 INJECTION INTRAMUSCULAR; INTRAVENOUS at 08:03

## 2025-03-14 RX ADMIN — PROCHLORPERAZINE EDISYLATE 10 MG: 5 INJECTION INTRAMUSCULAR; INTRAVENOUS at 10:03

## 2025-03-15 ENCOUNTER — HOSPITAL ENCOUNTER (INPATIENT)
Facility: HOSPITAL | Age: 85
LOS: 1 days | Discharge: HOME | DRG: 389 | End: 2025-03-16
Attending: INTERNAL MEDICINE | Admitting: INTERNAL MEDICINE
Payer: MEDICARE

## 2025-03-15 VITALS
RESPIRATION RATE: 25 BRPM | WEIGHT: 140 LBS | SYSTOLIC BLOOD PRESSURE: 155 MMHG | DIASTOLIC BLOOD PRESSURE: 65 MMHG | HEART RATE: 89 BPM | TEMPERATURE: 98 F | OXYGEN SATURATION: 95 % | BODY MASS INDEX: 21.97 KG/M2 | HEIGHT: 67 IN

## 2025-03-15 DIAGNOSIS — Z01.818 PREOP EXAMINATION: ICD-10-CM

## 2025-03-15 DIAGNOSIS — Z03.89: ICD-10-CM

## 2025-03-15 DIAGNOSIS — K56.609 SBO (SMALL BOWEL OBSTRUCTION): ICD-10-CM

## 2025-03-15 DIAGNOSIS — R07.9 CHEST PAIN: ICD-10-CM

## 2025-03-15 PROBLEM — I21.A1 TYPE 2 MI (MYOCARDIAL INFARCTION): Status: ACTIVE | Noted: 2025-03-15

## 2025-03-15 PROBLEM — F41.9 ANXIETY: Status: ACTIVE | Noted: 2025-03-15

## 2025-03-15 PROBLEM — E87.1 HYPONATREMIA: Status: ACTIVE | Noted: 2025-03-15

## 2025-03-15 LAB
ALBUMIN SERPL BCP-MCNC: 3.7 G/DL (ref 3.4–4.8)
ALBUMIN/GLOB SERPL: 1.5 {RATIO}
ALP SERPL-CCNC: 88 U/L (ref 40–150)
ALT SERPL W P-5'-P-CCNC: 13 U/L
ANION GAP SERPL CALCULATED.3IONS-SCNC: 16 MMOL/L (ref 7–16)
AORTIC ROOT ANNULUS: 2.45 CM
AORTIC VALVE CUSP SEPERATION: 2.04 CM
APICAL FOUR CHAMBER EJECTION FRACTION: 68 %
AST SERPL W P-5'-P-CCNC: 21 U/L (ref 11–45)
AV INDEX (PROSTH): 0.4
AV MEAN GRADIENT: 7 MMHG
AV PEAK GRADIENT: 13 MMHG
AV VALVE AREA BY VELOCITY RATIO: 1.6 CM²
AV VALVE AREA: 1.3 CM²
AV VELOCITY RATIO: 0.5
BASOPHILS # BLD AUTO: 0.01 K/UL (ref 0–0.2)
BASOPHILS NFR BLD AUTO: 0.1 % (ref 0–1)
BILIRUB SERPL-MCNC: 0.4 MG/DL
BSA FOR ECHO PROCEDURE: 1.74 M2
BUN SERPL-MCNC: 19 MG/DL (ref 10–20)
BUN/CREAT SERPL: 22 (ref 6–20)
CALCIUM SERPL-MCNC: 9.5 MG/DL (ref 8.4–10.2)
CHLORIDE SERPL-SCNC: 100 MMOL/L (ref 98–107)
CO2 SERPL-SCNC: 22 MMOL/L (ref 23–31)
CREAT SERPL-MCNC: 0.87 MG/DL (ref 0.55–1.02)
CV ECHO LV RWT: 0.39 CM
DIFFERENTIAL METHOD BLD: ABNORMAL
DOP CALC AO PEAK VEL: 1.8 M/S
DOP CALC AO VTI: 39.6 CM
DOP CALC LVOT AREA: 3.1 CM2
DOP CALC LVOT DIAMETER: 2 CM
DOP CALC LVOT PEAK VEL: 0.9 M/S
DOP CALC LVOT STROKE VOLUME: 49.9 CM3
DOP CALCLVOT PEAK VEL VTI: 15.9 CM
E WAVE DECELERATION TIME: 227 MSEC
E/A RATIO: 0.7
E/E' RATIO: 7 M/S
ECHO LV POSTERIOR WALL: 0.9 CM (ref 0.6–1.1)
EGFR (NO RACE VARIABLE) (RUSH/TITUS): 67 ML/MIN/1.73M2
EJECTION FRACTION: 60 %
EOSINOPHIL # BLD AUTO: 0 K/UL (ref 0–0.5)
EOSINOPHIL NFR BLD AUTO: 0 % (ref 1–4)
ERYTHROCYTE [DISTWIDTH] IN BLOOD BY AUTOMATED COUNT: 12.7 % (ref 11.5–14.5)
FRACTIONAL SHORTENING: 21.7 % (ref 28–44)
GLOBULIN SER-MCNC: 2.4 G/DL (ref 2–4)
GLUCOSE SERPL-MCNC: 134 MG/DL (ref 82–115)
HCT VFR BLD AUTO: 39.6 % (ref 38–47)
HGB BLD-MCNC: 13.8 G/DL (ref 12–16)
IMM GRANULOCYTES # BLD AUTO: 0.04 K/UL (ref 0–0.04)
IMM GRANULOCYTES NFR BLD: 0.3 % (ref 0–0.4)
INDIRECT COOMBS: NORMAL
INR BLD: 1.1
INTERVENTRICULAR SEPTUM: 1.1 CM (ref 0.6–1.1)
IVC DIAMETER: 0.98 CM
LEFT ATRIUM AREA SYSTOLIC (APICAL 4 CHAMBER): 11.75 CM2
LEFT ATRIUM SIZE: 2.2 CM
LEFT INTERNAL DIMENSION IN SYSTOLE: 3.6 CM (ref 2.1–4)
LEFT VENTRICLE DIASTOLIC VOLUME INDEX: 56.32 ML/M2
LEFT VENTRICLE DIASTOLIC VOLUME: 98 ML
LEFT VENTRICLE END DIASTOLIC VOLUME APICAL 4 CHAMBER: 46.2 ML
LEFT VENTRICLE END SYSTOLIC VOLUME APICAL 4 CHAMBER: 22.12 ML
LEFT VENTRICLE MASS INDEX: 91.3 G/M2
LEFT VENTRICLE SYSTOLIC VOLUME INDEX: 31.6 ML/M2
LEFT VENTRICLE SYSTOLIC VOLUME: 55 ML
LEFT VENTRICULAR INTERNAL DIMENSION IN DIASTOLE: 4.6 CM (ref 3.5–6)
LEFT VENTRICULAR MASS: 158.8 G
LV LATERAL E/E' RATIO: 6.4 M/S
LV SEPTAL E/E' RATIO: 8 M/S
LVED V (TEICH): 97.83 ML
LVES V (TEICH): 54.64 ML
LVOT MG: 1.61 MMHG
LVOT MV: 0.58 CM/S
LYMPHOCYTES # BLD AUTO: 0.66 K/UL (ref 1–4.8)
LYMPHOCYTES NFR BLD AUTO: 4.8 % (ref 27–41)
MCH RBC QN AUTO: 31.4 PG (ref 27–31)
MCHC RBC AUTO-ENTMCNC: 34.8 G/DL (ref 32–36)
MCV RBC AUTO: 90.2 FL (ref 80–96)
MONOCYTES # BLD AUTO: 0.68 K/UL (ref 0–0.8)
MONOCYTES NFR BLD AUTO: 4.9 % (ref 2–6)
MPC BLD CALC-MCNC: 9.9 FL (ref 9.4–12.4)
MV PEAK A VEL: 0.92 M/S
MV PEAK E VEL: 0.64 M/S
MV STENOSIS PRESSURE HALF TIME: 65.94 MS
MV VALVE AREA P 1/2 METHOD: 3.34 CM2
NEUTROPHILS # BLD AUTO: 12.36 K/UL (ref 1.8–7.7)
NEUTROPHILS NFR BLD AUTO: 89.9 % (ref 53–65)
NRBC # BLD AUTO: 0 X10E3/UL
NRBC, AUTO (.00): 0 %
OHS CV RV/LV RATIO: 0.72 CM
PISA TR MAX VEL: 1.6 M/S
PLATELET # BLD AUTO: 222 K/UL (ref 150–400)
POTASSIUM SERPL-SCNC: 3.7 MMOL/L (ref 3.5–5.1)
PROT SERPL-MCNC: 6.1 G/DL (ref 5.8–7.6)
PROTHROMBIN TIME: 14.1 SECONDS (ref 11.7–14.7)
PV PEAK GRADIENT: 4 MMHG
PV PEAK VELOCITY: 1.06 M/S
RA MAJOR: 3.39 CM
RA PRESSURE ESTIMATED: 3 MMHG
RBC # BLD AUTO: 4.39 M/UL (ref 4.2–5.4)
RH BLD: NORMAL
RIGHT VENTRICLE DIASTOLIC BASEL DIMENSION: 3.3 CM
RIGHT VENTRICLE DIASTOLIC LENGTH: 4.8 CM
RIGHT VENTRICLE DIASTOLIC MID DIMENSION: 2.6 CM
RIGHT VENTRICULAR LENGTH IN DIASTOLE (APICAL 4-CHAMBER VIEW): 4.79 CM
RV MID DIAMA: 2.57 CM
RV TB RVSP: 5 MMHG
SODIUM SERPL-SCNC: 134 MMOL/L (ref 136–145)
SPECIMEN OUTDATE: NORMAL
TDI LATERAL: 0.1 M/S
TDI SEPTAL: 0.08 M/S
TDI: 0.09 M/S
TR MAX PG: 10 MMHG
TRICUSPID ANNULAR PLANE SYSTOLIC EXCURSION: 1.57 CM
TROPONIN I SERPL HS-MCNC: 40.9 NG/L
TV REST PULMONARY ARTERY PRESSURE: 13 MMHG
WBC # BLD AUTO: 13.75 K/UL (ref 4.5–11)
Z-SCORE OF LEFT VENTRICULAR DIMENSION IN END DIASTOLE: -0.46
Z-SCORE OF LEFT VENTRICULAR DIMENSION IN END SYSTOLE: 1.49

## 2025-03-15 PROCEDURE — 99223 1ST HOSP IP/OBS HIGH 75: CPT | Mod: ,,, | Performed by: SURGERY

## 2025-03-15 PROCEDURE — 96372 THER/PROPH/DIAG INJ SC/IM: CPT | Performed by: SPECIALIST

## 2025-03-15 PROCEDURE — 84484 ASSAY OF TROPONIN QUANT: CPT

## 2025-03-15 PROCEDURE — 86900 BLOOD TYPING SEROLOGIC ABO: CPT

## 2025-03-15 PROCEDURE — 93010 ELECTROCARDIOGRAM REPORT: CPT | Mod: ,,, | Performed by: HOSPITALIST

## 2025-03-15 PROCEDURE — 99499 UNLISTED E&M SERVICE: CPT | Mod: ,,, | Performed by: STUDENT IN AN ORGANIZED HEALTH CARE EDUCATION/TRAINING PROGRAM

## 2025-03-15 PROCEDURE — 63600175 PHARM REV CODE 636 W HCPCS

## 2025-03-15 PROCEDURE — 85610 PROTHROMBIN TIME: CPT

## 2025-03-15 PROCEDURE — 80053 COMPREHEN METABOLIC PANEL: CPT

## 2025-03-15 PROCEDURE — 36415 COLL VENOUS BLD VENIPUNCTURE: CPT

## 2025-03-15 PROCEDURE — 99223 1ST HOSP IP/OBS HIGH 75: CPT | Mod: AI,,, | Performed by: INTERNAL MEDICINE

## 2025-03-15 PROCEDURE — 93005 ELECTROCARDIOGRAM TRACING: CPT

## 2025-03-15 PROCEDURE — 63600175 PHARM REV CODE 636 W HCPCS: Performed by: SPECIALIST

## 2025-03-15 PROCEDURE — 11000001 HC ACUTE MED/SURG PRIVATE ROOM

## 2025-03-15 PROCEDURE — 85025 COMPLETE CBC W/AUTO DIFF WBC: CPT

## 2025-03-15 RX ORDER — ENOXAPARIN SODIUM 100 MG/ML
40 INJECTION SUBCUTANEOUS EVERY 24 HOURS
Status: DISCONTINUED | OUTPATIENT
Start: 2025-03-15 | End: 2025-03-16 | Stop reason: HOSPADM

## 2025-03-15 RX ORDER — SIMETHICONE 80 MG
1 TABLET,CHEWABLE ORAL 3 TIMES DAILY PRN
Status: DISCONTINUED | OUTPATIENT
Start: 2025-03-15 | End: 2025-03-16 | Stop reason: HOSPADM

## 2025-03-15 RX ORDER — ESCITALOPRAM OXALATE 10 MG/1
10 TABLET ORAL NIGHTLY
Status: DISCONTINUED | OUTPATIENT
Start: 2025-03-15 | End: 2025-03-15

## 2025-03-15 RX ORDER — IBUPROFEN 200 MG
16 TABLET ORAL
Status: DISCONTINUED | OUTPATIENT
Start: 2025-03-15 | End: 2025-03-16 | Stop reason: HOSPADM

## 2025-03-15 RX ORDER — MECLIZINE HYDROCHLORIDE 25 MG/1
25 TABLET ORAL 2 TIMES DAILY PRN
Status: DISCONTINUED | OUTPATIENT
Start: 2025-03-15 | End: 2025-03-16 | Stop reason: HOSPADM

## 2025-03-15 RX ORDER — DIATRIZOATE MEGLUMINE AND DIATRIZOATE SODIUM 660; 100 MG/ML; MG/ML
240 SOLUTION ORAL; RECTAL
Status: DISCONTINUED | OUTPATIENT
Start: 2025-03-15 | End: 2025-03-16 | Stop reason: HOSPADM

## 2025-03-15 RX ORDER — DILTIAZEM HYDROCHLORIDE 120 MG/1
240 CAPSULE, COATED, EXTENDED RELEASE ORAL DAILY
Status: DISCONTINUED | OUTPATIENT
Start: 2025-03-15 | End: 2025-03-15

## 2025-03-15 RX ORDER — ACETAMINOPHEN 325 MG/1
650 TABLET ORAL EVERY 4 HOURS PRN
Status: DISCONTINUED | OUTPATIENT
Start: 2025-03-15 | End: 2025-03-16 | Stop reason: HOSPADM

## 2025-03-15 RX ORDER — GLUCAGON 1 MG
1 KIT INJECTION
Status: DISCONTINUED | OUTPATIENT
Start: 2025-03-15 | End: 2025-03-16 | Stop reason: HOSPADM

## 2025-03-15 RX ORDER — PROMETHAZINE HYDROCHLORIDE 25 MG/ML
25 INJECTION, SOLUTION INTRAMUSCULAR; INTRAVENOUS
Status: COMPLETED | OUTPATIENT
Start: 2025-03-15 | End: 2025-03-15

## 2025-03-15 RX ORDER — LABETALOL HYDROCHLORIDE 5 MG/ML
10 INJECTION, SOLUTION INTRAVENOUS EVERY 6 HOURS PRN
Status: DISCONTINUED | OUTPATIENT
Start: 2025-03-15 | End: 2025-03-16 | Stop reason: HOSPADM

## 2025-03-15 RX ORDER — NALOXONE HCL 0.4 MG/ML
0.02 VIAL (ML) INJECTION
Status: DISCONTINUED | OUTPATIENT
Start: 2025-03-15 | End: 2025-03-16 | Stop reason: HOSPADM

## 2025-03-15 RX ORDER — SODIUM CHLORIDE 0.9 % (FLUSH) 0.9 %
10 SYRINGE (ML) INJECTION EVERY 12 HOURS PRN
Status: DISCONTINUED | OUTPATIENT
Start: 2025-03-15 | End: 2025-03-16 | Stop reason: HOSPADM

## 2025-03-15 RX ORDER — IBUPROFEN 200 MG
24 TABLET ORAL
Status: DISCONTINUED | OUTPATIENT
Start: 2025-03-15 | End: 2025-03-16 | Stop reason: HOSPADM

## 2025-03-15 RX ADMIN — ENOXAPARIN SODIUM 40 MG: 40 INJECTION SUBCUTANEOUS at 06:03

## 2025-03-15 RX ADMIN — PROMETHAZINE HYDROCHLORIDE 25 MG: 25 INJECTION INTRAMUSCULAR; INTRAVENOUS at 12:03

## 2025-03-15 NOTE — ASSESSMENT & PLAN NOTE
Patient presenting with small bowel obstruction  from [facility]. Associated nausea and vomiting over the last 4 weeks with increasing frequency. CT abd/pelvis demonstrating distended loops of small bowel. Prior history of hx of colorectal cancer s/p chemotherapy and proctocolectomy with diverting ostomy in 2000 at a hospital in [place], [place] per patient. Patient seen at [facility] in [place], [place] on 3/2021 and diagnosed with enteritis and constipation. She was seen again on 12/2023 at the same hospital of nausea, vomiting, and sbo complain. In February 2025 patient seen at [facility] in [place] for small bowel obstruction. She had a CT scan showing high grade obstruction with transition point in the RLQ. She was given NG tube with significant improvement in her N/V. She was discharged home in stable condition. RCRI score 2 points, 10.1% risk of major cardiac event. Will consult general surgery and complete pre-op workup including PT-INR, type & screen, CXR, and EKG. Patient got a CBC, CMP, and UA prior to transfer. Patient NPO. NGT to suction for bowel decompression.

## 2025-03-15 NOTE — PLAN OF CARE
Problem: Fall Injury Risk  Goal: Absence of Fall and Fall-Related Injury  Outcome: Progressing  Intervention: Identify and Manage Contributors  Flowsheets (Taken 3/15/2025 0240)  Self-Care Promotion:   independence encouraged   BADL personal objects within reach   BADL personal routines maintained   meal set-up provided   adaptive equipment use encouraged  Medication Review/Management: medications reviewed     Problem: Adult Inpatient Plan of Care  Goal: Plan of Care Review  Outcome: Progressing  Flowsheets (Taken 3/15/2025 0240)  Plan of Care Reviewed With: patient

## 2025-03-15 NOTE — ED NOTES
Pt accepted to [facility] room 550.   Number for report [phone].  [facility]

## 2025-03-15 NOTE — ASSESSMENT & PLAN NOTE
Presenting with mildly elevated troponins, 18.9 and 33.5, with EKG showing NS with PAC's. Ischemia likely secondary to severe dehydration 2/2 to nausea and vomiting. Patient previously seen by cardiology in August 2024. Cardiac monitor showed predominantly normal sinus with occasional runs of Vtach. Cardiology included an Echo (8/14/24) EF 60-65% with mild AV regurg and nuclear stress test that was negative for ischemia. Will consult cardiology to evaluate. Family history of CAD, but patient has no personal prior history of CAD or stent placement.     Troponin flat.  Outpatient follow up

## 2025-03-15 NOTE — ASSESSMENT & PLAN NOTE
Hyponatremia is likely due to Dehydration/hypovolemia. The patient's most recent sodium results are listed below.  Recent Labs     03/14/25  1958 03/15/25  0412    134*     Plan  - Correct the sodium by 4-6mEq in 24 hours.   - Obtain the following studies:  We will correct with resolution of nausea vomiting.  - Will treat the hyponatremia with received IV fluids.  Clear liquid diet now  - Monitor sodium Daily.   - Patient hyponatremia is stable  - follow

## 2025-03-15 NOTE — CONSULTS
[facility] - [address] [place] [address]  General Surgery  Consult Note    Inpatient consult to General Surgery  Consult performed by: Abhinav Trujillo MD  Consult ordered by: Rukhsana Guerrero MD  Reason for consult: Small bowel obstruction  Assessment/Recommendations: Will do a Gastrografin challenge with the patient today see how she does.  Hopefully she opened up she had a big bowel movement yesterday it is not nauseous today.  Started clear liquid diet if it is negative.        Subjective:     Chief Complaint/Reason for Admission:  Small bowel obstruction    History of Present Illness:  84-year-old female history of colectomy with the ostomy in the past.  She states she has had a few episodes of bowel obstructions treated conservatively.  States for day or 2 she has some pain nausea and vomiting.  She had some nausea yesterday morning and then had a big bowel movement overnight.  Currently she feels pretty good.    Current Facility-Administered Medications on File Prior to Encounter   Medication    [COMPLETED] 0.9% NaCl infusion    [COMPLETED] heparin 25,000 units in dextrose 5% (100 units/ml) IV bolus from bag LOW INTENSITY nomogram - [facility]    [COMPLETED] iopamidoL (ISOVUE-370) injection 100 mL    [COMPLETED] ondansetron injection 4 mg    [COMPLETED] pantoprazole injection 40 mg    [COMPLETED] prochlorperazine injection Soln 10 mg    [COMPLETED] promethazine injection 25 mg    [DISCONTINUED] heparin 25,000 units in dextrose 5% (100 units/ml) IV bolus from bag LOW INTENSITY nomogram - [facility]    [DISCONTINUED] heparin 25,000 units in dextrose 5% (100 units/ml) IV bolus from bag LOW INTENSITY nomogram - [facility]    [DISCONTINUED] heparin 25,000 units in dextrose 5% 250 mL (100 units/mL) infusion LOW INTENSITY nomogram - [facility]    [DISCONTINUED] prochlorperazine tablet 10 mg     Current Outpatient Medications on File Prior to Encounter   Medication Sig    diltiaZEM (CARDIZEM CD) 240 MG 24 hr capsule Take 1 capsule  (240 mg total) by mouth once daily.    latanoprost 0.005 % ophthalmic solution Place 1 drop into both eyes every evening.    EScitalopram oxalate (LEXAPRO) 10 MG tablet Take 1 tablet by mouth every evening.    [DISCONTINUED] meclizine (ANTIVERT) 25 mg tablet 25 mg 2 (two) times daily as needed.       Review of patient's allergies indicates:   Allergen Reactions    Aspirin Nausea And Vomiting    Codeine Nausea And Vomiting    Loratadine Nausea And Vomiting       Past Medical History:   Diagnosis Date    Acid reflux     Colorectal cancer 20 years ago    Hypertension     Unspecified glaucoma      Past Surgical History:   Procedure Laterality Date    COLON SURGERY      COLOSTOMY      COLOSTOMY      EYE SURGERY      HYSTERECTOMY       Family History    None       Tobacco Use    Smoking status: Former     Types: Cigarettes    Smokeless tobacco: Never   Substance and Sexual Activity    Alcohol use: Never    Drug use: Never    Sexual activity: Not Currently     Review of Systems   Constitutional:  Negative for activity change, appetite change, fatigue and fever.   HENT:  Negative for trouble swallowing.    Respiratory:  Negative for cough and shortness of breath.    Cardiovascular:  Negative for chest pain and palpitations.   Gastrointestinal:  Positive for abdominal pain, nausea and vomiting. Negative for abdominal distention, blood in stool, constipation and diarrhea.   Genitourinary:  Negative for flank pain.   Musculoskeletal:  Negative for neck pain and neck stiffness.   Neurological:  Negative for weakness.     Objective:     Vital Signs (Most Recent):  Temp: 97.5 °F (36.4 °C) (03/15/25 0736)  Pulse: 81 (03/15/25 0736)  Resp: 15 (03/15/25 0736)  BP: 108/64 (03/15/25 0736)  SpO2: 95 % (03/15/25 0736) Vital Signs (24h Range):  Temp:  [97.5 °F (36.4 °C)-98.9 °F (37.2 °C)] 97.5 °F (36.4 °C)  Pulse:  [72-89] 81  Resp:  [14-27] 15  SpO2:  [93 %-99 %] 95 %  BP: (108-155)/(56-72) 108/64     Weight: 63.8 kg (140 lb 10.5  "oz)  Body mass index is 22.03 kg/m².      Intake/Output Summary (Last 24 hours) at 3/15/2025 0856  Last data filed at 3/15/2025 0315  Gross per 24 hour   Intake --   Output 150 ml   Net -150 ml       Physical Exam  Constitutional:       General: She is not in acute distress.  HENT:      Head: Normocephalic.   Cardiovascular:      Rate and Rhythm: Normal rate and regular rhythm.      Pulses: Normal pulses.   Pulmonary:      Effort: Pulmonary effort is normal. No respiratory distress.      Breath sounds: Normal breath sounds.   Abdominal:      General: Abdomen is flat. There is no distension.      Palpations: Abdomen is soft.      Tenderness: There is no abdominal tenderness.   Musculoskeletal:         General: Normal range of motion.   Skin:     General: Skin is warm.   Neurological:      General: No focal deficit present.      Mental Status: She is oriented to person, place, and time.         Significant Labs:  Cardiac markers: No results for input(s): "CKMB", "CPKMB", "TROPONINT", "TROPONINI", "MYOGLOBIN" in the last 48 hours.  CBC:   Recent Labs   Lab 03/15/25  0412   WBC 13.75*   RBC 4.39   HGB 13.8   HCT 39.6      MCV 90.2   MCH 31.4*   MCHC 34.8     CMP:   Recent Labs   Lab 03/15/25  0412   *   CALCIUM 9.5   ALBUMIN 3.7   PROT 6.1   *   K 3.7   CO2 22*      BUN 19   CREATININE 0.87   ALKPHOS 88   ALT 13   AST 21   BILITOT 0.4     Coagulation:   Recent Labs   Lab 03/14/25  2018 03/15/25  0433   INR 0.97 1.10   APTT 25.7  --      Lactic Acid: No results for input(s): "LACTATE" in the last 48 hours.    Significant Diagnostics:  I have reviewed all pertinent imaging results/findings within the past 24 hours.    Assessment/Plan:     Active Diagnoses:    Diagnosis Date Noted POA    PRINCIPAL PROBLEM:  SBO (small bowel obstruction) [K56.609] 05/25/2021 Yes    Type 2 MI (myocardial infarction) [I21.A1] 03/15/2025 Yes    Anxiety [F41.9] 03/15/2025 Yes    Essential hypertension [I10] 08/14/2024 " Yes      Problems Resolved During this Admission:       Thank you for your consult. I will follow-up with patient. Please contact us if you have any additional questions.    Abhinav Trujillo MD  General Surgery  [facility] - [address] [place] [address]

## 2025-03-15 NOTE — HPI
84 year old female transferred from [facility] with SBO. Patient presented to the ED at [place] with nausea and vomiting. Given zofran 4 mg and started on normal saline at 125 ml/hr for dehydration. Labs were significant mildly elevated troponins with an EKG showing normal sinus with PAC's. Patient started on a heparin drip. On CT found to have a small bowel obstruction. Attempt made to place NGT, but patient refused. Patient given promethazine prior to transfer. Patient will be admitted to hospital medicine at [facility] under the direct supervision of Dr. Faith and will consult general surgery and cardiology.

## 2025-03-15 NOTE — ASSESSMENT & PLAN NOTE
Presenting with mildly elevated troponins, 18.9 and 33.5, with EKG showing NS with PAC's. Ischemia likely secondary to severe dehydration 2/2 to nausea and vomiting. Patient previously seen by cardiology in August 2024. Cardiac monitor showed predominantly normal sinus with occasional runs of Vtach. Cardiology included an Echo (8/14/24) EF 60-65% with mild AV regurg and nuclear stress test that was negative for ischemia. Will consult cardiology to evaluate. Family history of CAD, but patient has no personal prior history of CAD or stent placement.

## 2025-03-15 NOTE — HOSPITAL COURSE
3/15 doing well.  Large bowel movement yesterday.  No more nausea  3/16 stable for discharge follow up PCP

## 2025-03-15 NOTE — ASSESSMENT & PLAN NOTE
Patient presenting with small bowel obstruction  from [facility]. Associated nausea and vomiting over the last 4 weeks with increasing frequency. CT abd/pelvis demonstrating distended loops of small bowel. Prior history of hx of colorectal cancer s/p chemotherapy and proctocolectomy with diverting ostomy in 2000 at a hospital in [place], [place] per patient. Patient seen at [facility] in [place], [place] on 3/2021 and diagnosed with enteritis and constipation. She was seen again on 12/2023 at the same hospital of nausea, vomiting, and sbo complain. In February 2025 patient seen at [facility] in [place] for small bowel obstruction. She had a CT scan showing high grade obstruction with transition point in the RLQ. She was given NG tube with significant improvement in her N/V. She was discharged home in stable condition. RCRI score 2 points, 10.1% risk of major cardiac event. Will consult general surgery and complete pre-op workup including PT-INR, type & screen, CXR, and EKG. Patient got a CBC, CMP, and UA prior to transfer. Patient NPO. NGT to suction for bowel decompression.   3/15 large bowel movement yesterday no nausea today.  Clears per surgery.  Can discharge when okay with surgery

## 2025-03-15 NOTE — NURSING
"0140  3/15/25    Recived pt. at 0140 from EMS.    Pt currently has a low intensity heparin drip running at 12 units/kg/hr and NS@ 125.    I notified Dr. Guerrero of the heparin drip when he came by to see the pt.     Per Dr. Guerrero, holding heparin infusion incase the pt goes to surgery.    Heparin gtt stopped at 0408      0425  3/15/25    Pt is refusing to have an NG tube placed.     Education provided to pt about the importance of an NG tube when one has a SBO, N, & V.     Pt states "I have had an NG tube 4 times. You will have to put me to sleep."    Dr. Rukhsana Guerrero notified.       Yaritza Jiang is a 84 y.o. female patient.    Active Hospital Problems    Diagnosis  POA    *SBO (small bowel obstruction) [K56.609]  Yes    Type 2 MI (myocardial infarction) [I21.A1]  Yes      Resolved Hospital Problems   No resolved problems to display.     Temp: 98.9 °F (37.2 °C) (03/15/25 0425)  Pulse: 81 (03/15/25 0425)  Resp: 14 (03/15/25 0425)  BP: 129/60 (03/15/25 0425)  SpO2: (!) 94 % (03/15/25 0425)  Weight: 63.8 kg (140 lb 10.5 oz) (03/15/25 0221)  Height: 5' 7" (170.2 cm) (03/15/25 0221)      Micheline Walsh RN  3/15/2025      "

## 2025-03-15 NOTE — PROGRESS NOTES
[facility] - [facility] Hospital Medicine  Progress Note    Patient Name: Yaritza Jiang  MRN: [id number]  Patient Class: IP- Inpatient   Admission Date: 3/15/2025  Length of Stay: 0 days  Attending Physician: Enrrique aEton DO  Primary Care Provider: Mara Jo MD        Subjective     Principal Problem:SBO (small bowel obstruction)        HPI:  84 year old female transferred from [facility] with SBO. Patient presented to the ED at [facility] with nausea and vomiting. Given zofran 4 mg and started on normal saline at 125 ml/hr for dehydration. Labs were significant mildly elevated troponins with an EKG showing normal sinus with PAC's. Patient started on a heparin drip. On CT found to have a small bowel obstruction. Attempt made to place NGT, but patient refused. Patient given promethazine prior to transfer. Patient will be admitted to hospital medicine at [facility] under the direct supervision of Dr. Faith and will consult general surgery and cardiology.     Overview/Hospital Course:  3/15 doing well.  Large bowel movement yesterday.  No more nausea    Interval History:  No acute events overnight    Review of Systems  Objective:     Vital Signs (Most Recent):  Temp: 98.6 °F (37 °C) (03/15/25 1108)  Pulse: 80 (03/15/25 1108)  Resp: 17 (03/15/25 1108)  BP: (!) 110/53 (03/15/25 1108)  SpO2: (!) 94 % (03/15/25 1108) Vital Signs (24h Range):  Temp:  [97.5 °F (36.4 °C)-98.9 °F (37.2 °C)] 98.6 °F (37 °C)  Pulse:  [72-89] 80  Resp:  [14-27] 17  SpO2:  [93 %-99 %] 94 %  BP: (108-155)/(53-72) 110/53     Weight: 63.8 kg (140 lb 10.5 oz)  Body mass index is 22.03 kg/m².    Intake/Output Summary (Last 24 hours) at 3/15/2025 1123  Last data filed at 3/15/2025 0315  Gross per 24 hour   Intake --   Output 150 ml   Net -150 ml         Physical Exam  Vitals and nursing note reviewed.   Constitutional:       Appearance: Normal appearance.   HENT:      Head: Normocephalic and atraumatic.      Nose: Nose  normal.      Mouth/Throat:      Mouth: Mucous membranes are moist.      Pharynx: Oropharynx is clear.   Eyes:      Conjunctiva/sclera: Conjunctivae normal.      Pupils: Pupils are equal, round, and reactive to light.   Neck:      Vascular: No carotid bruit.   Cardiovascular:      Rate and Rhythm: Normal rate and regular rhythm.      Pulses: Normal pulses.      Heart sounds: Normal heart sounds. No murmur heard.     No friction rub. No gallop.   Pulmonary:      Effort: Pulmonary effort is normal.      Breath sounds: Normal breath sounds.   Abdominal:      General: There is no distension.      Tenderness: There is no abdominal tenderness. There is no guarding or rebound.      Comments: Left lower quadrant colostomy c/d/i   Musculoskeletal:      Right lower leg: No edema.      Left lower leg: No edema.   Skin:     General: Skin is warm and dry.      Capillary Refill: Capillary refill takes less than 2 seconds.   Neurological:      General: No focal deficit present.      Mental Status: She is alert and oriented to person, place, and time.               Significant Labs: All pertinent labs within the past 24 hours have been reviewed.    Significant Imaging: I have reviewed all pertinent imaging results/findings within the past 24 hours.      Assessment & Plan  SBO (small bowel obstruction)  Patient presenting with small bowel obstruction  from [facility]. Associated nausea and vomiting over the last 4 weeks with increasing frequency. CT abd/pelvis demonstrating distended loops of small bowel. Prior history of hx of colorectal cancer s/p chemotherapy and proctocolectomy with diverting ostomy in 2000 at a hospital in [place], [place] per patient. Patient seen at [facility] in [place], [place] on 3/2021 and diagnosed with enteritis and constipation. She was seen again on 12/2023 at the same hospital of nausea, vomiting, and sbo complain. In February 2025 patient seen at [facility] in [place] for small bowel  obstruction. She had a CT scan showing high grade obstruction with transition point in the RLQ. She was given NG tube with significant improvement in her N/V. She was discharged home in stable condition. RCRI score 2 points, 10.1% risk of major cardiac event. Will consult general surgery and complete pre-op workup including PT-INR, type & screen, CXR, and EKG. Patient got a CBC, CMP, and UA prior to transfer. Patient NPO. NGT to suction for bowel decompression.   3/15 large bowel movement yesterday no nausea today.  Clears per surgery.  Can discharge when okay with surgery    Essential hypertension  History of hypertension started on diltiazem 240 mg po daily by cardiology for essential hypertension. Patient is well controlled at this time. Will continue home CCB and monitor blood pressure.   Type 2 MI (myocardial infarction)  Presenting with mildly elevated troponins, 18.9 and 33.5, with EKG showing NS with PAC's. Ischemia likely secondary to severe dehydration 2/2 to nausea and vomiting. Patient previously seen by cardiology in August 2024. Cardiac monitor showed predominantly normal sinus with occasional runs of Vtach. Cardiology included an Echo (8/14/24) EF 60-65% with mild AV regurg and nuclear stress test that was negative for ischemia. Will consult cardiology to evaluate. Family history of CAD, but patient has no personal prior history of CAD or stent placement.     Troponin flat.  Outpatient follow up    Anxiety  Continue home escitalopram 10 mg po daily.    Hyponatremia  Hyponatremia is likely due to Dehydration/hypovolemia. The patient's most recent sodium results are listed below.  Recent Labs     03/14/25  1958 03/15/25  0412    134*     Plan  - Correct the sodium by 4-6mEq in 24 hours.   - Obtain the following studies:  We will correct with resolution of nausea vomiting.  - Will treat the hyponatremia with received IV fluids.  Clear liquid diet now  - Monitor sodium Daily.   - Patient  hyponatremia is stable  - follow  VTE Risk Mitigation (From admission, onward)           Ordered     enoxaparin injection 40 mg  Every 24 hours         03/15/25 0536     Reason for No Pharmacological VTE Prophylaxis  Once        Question:  Reasons:  Answer:  IV Heparin w/in 24 hrs. Pre or Post-Op    03/15/25 0346     IP VTE HIGH RISK PATIENT  Once         03/15/25 0346     Place sequential compression device  Until discontinued         03/15/25 0346                    Discharge Planning   ORI:      Code Status: Full Code   Medical Readiness for Discharge Date:            Labs and consultant notes reviewed.  Metabolic panel tomorrow.  Chest x-ray reviewed and independently interpreted no obvious effusion or consolidation                Enrrique Eaton DO  Department of Hospital Medicine   [facility] - [address] [place] [address]

## 2025-03-15 NOTE — SUBJECTIVE & OBJECTIVE
Past Medical History:   Diagnosis Date    Acid reflux     Colorectal cancer 20 years ago    Hypertension     Unspecified glaucoma        Past Surgical History:   Procedure Laterality Date    COLON SURGERY      COLOSTOMY      COLOSTOMY      EYE SURGERY      HYSTERECTOMY         Review of patient's allergies indicates:   Allergen Reactions    Aspirin Nausea And Vomiting    Codeine Nausea And Vomiting    Loratadine Nausea And Vomiting       Current Facility-Administered Medications on File Prior to Encounter   Medication    [COMPLETED] 0.9% NaCl infusion    [COMPLETED] heparin 25,000 units in dextrose 5% (100 units/ml) IV bolus from bag LOW INTENSITY nomogram - [facility]    [COMPLETED] iopamidoL (ISOVUE-370) injection 100 mL    [COMPLETED] ondansetron injection 4 mg    [COMPLETED] pantoprazole injection 40 mg    [COMPLETED] prochlorperazine injection Soln 10 mg    [COMPLETED] promethazine injection 25 mg    [DISCONTINUED] heparin 25,000 units in dextrose 5% (100 units/ml) IV bolus from bag LOW INTENSITY nomogram - [facility]    [DISCONTINUED] heparin 25,000 units in dextrose 5% (100 units/ml) IV bolus from bag LOW INTENSITY nomogram - [facility]    [DISCONTINUED] heparin 25,000 units in dextrose 5% 250 mL (100 units/mL) infusion LOW INTENSITY nomogram - [facility]    [DISCONTINUED] prochlorperazine tablet 10 mg     Current Outpatient Medications on File Prior to Encounter   Medication Sig    diltiaZEM (CARDIZEM CD) 240 MG 24 hr capsule Take 1 capsule (240 mg total) by mouth once daily.    EScitalopram oxalate (LEXAPRO) 10 MG tablet Take 1 tablet by mouth every evening.    latanoprost 0.005 % ophthalmic solution Place 1 drop into both eyes every evening.    meclizine (ANTIVERT) 25 mg tablet 25 mg 2 (two) times daily as needed.     Family History    None       Tobacco Use    Smoking status: Former     Types: Cigarettes    Smokeless tobacco: Never   Substance and Sexual Activity    Alcohol use: Never    Drug use: Never    Sexual activity: Not  Currently     Review of Systems   Constitutional:  Negative for chills, diaphoresis and fever.   HENT:  Negative for congestion, ear pain, rhinorrhea, sinus pain and sore throat.    Eyes:  Negative for pain and visual disturbance.   Respiratory:  Negative for cough, shortness of breath and wheezing.    Cardiovascular:  Negative for chest pain.   Gastrointestinal:  Positive for abdominal pain, constipation, nausea and vomiting. Negative for diarrhea.   Endocrine: Negative for polydipsia, polyphagia and polyuria.   Genitourinary:  Negative for dysuria, flank pain and hematuria.   Musculoskeletal:  Negative for back pain and neck pain.   Neurological:  Positive for dizziness and weakness. Negative for syncope, numbness and headaches.   Hematological:  Does not bruise/bleed easily.   Psychiatric/Behavioral:  Negative for behavioral problems and confusion.      Objective:     Vital Signs (Most Recent):  Temp: 98.2 °F (36.8 °C) (03/15/25 0143)  Pulse: 77 (03/15/25 0143)  Resp: (!) 22 (03/15/25 0143)  BP: 139/67 (03/15/25 0143)  SpO2: (!) 93 % (03/15/25 0143) Vital Signs (24h Range):  Temp:  [97.6 °F (36.4 °C)-98.3 °F (36.8 °C)] 98.2 °F (36.8 °C)  Pulse:  [72-89] 77  Resp:  [16-27] 22  SpO2:  [93 %-99 %] 93 %  BP: (116-155)/(56-72) 139/67     Weight: 63.8 kg (140 lb 10.5 oz)  Body mass index is 22.03 kg/m².     Physical Exam  Vitals and nursing note reviewed.   Constitutional:       Appearance: Normal appearance.   HENT:      Head: Normocephalic and atraumatic.      Nose: Nose normal.      Mouth/Throat:      Mouth: Mucous membranes are moist.      Pharynx: Oropharynx is clear.   Eyes:      Conjunctiva/sclera: Conjunctivae normal.      Pupils: Pupils are equal, round, and reactive to light.   Neck:      Vascular: No carotid bruit.   Cardiovascular:      Rate and Rhythm: Normal rate and regular rhythm.      Pulses: Normal pulses.      Heart sounds: Normal heart sounds. No murmur heard.     No friction rub. No gallop.    Pulmonary:      Effort: Pulmonary effort is normal.      Breath sounds: Normal breath sounds.   Abdominal:      General: There is no distension.      Tenderness: There is abdominal tenderness. There is no guarding or rebound.      Comments: Left lower quadrant colostomy c/d/i   Musculoskeletal:      Right lower leg: No edema.      Left lower leg: No edema.   Skin:     General: Skin is warm and dry.      Capillary Refill: Capillary refill takes less than 2 seconds.   Neurological:      General: No focal deficit present.      Mental Status: She is alert and oriented to person, place, and time.              CRANIAL NERVES     CN III, IV, VI   Pupils are equal, round, and reactive to light.       Significant Labs: All pertinent labs within the past 24 hours have been reviewed.    Significant Imaging: I have reviewed all pertinent imaging results/findings within the past 24 hours.

## 2025-03-15 NOTE — H&P
[facility] - [facility] Tele[facility] Hospital Medicine  History & Physical    Patient Name: Yaritza Jiang  MRN: [id number]  Patient Class: IP- Inpatient  Admission Date: 3/15/2025  Attending Physician: Rosendo Faith MD   Primary Care Provider: Mara Jo MD         Patient information was obtained from patient and ER records.     Subjective:     Principal Problem:SBO (small bowel obstruction)    Chief Complaint: No chief complaint on file.       HPI: 84 year old female transferred from [facility] with SBO. Patient presented to the ED at [facility] with nausea and vomiting. Given zofran 4 mg and started on normal saline at 125 ml/hr for dehydration. Labs were significant mildly elevated troponins with an EKG showing normal sinus with PAC's. Patient started on a heparin drip. On CT found to have a small bowel obstruction. Attempt made to place NGT, but patient refused. Patient given promethazine prior to transfer. Patient will be admitted to hospital medicine at [facility] under the direct supervision of Dr. Faith and will consult general surgery and cardiology.     Past Medical History:   Diagnosis Date    Acid reflux     Colorectal cancer 20 years ago    Hypertension     Unspecified glaucoma        Past Surgical History:   Procedure Laterality Date    COLON SURGERY      COLOSTOMY      COLOSTOMY      EYE SURGERY      HYSTERECTOMY         Review of patient's allergies indicates:   Allergen Reactions    Aspirin Nausea And Vomiting    Codeine Nausea And Vomiting    Loratadine Nausea And Vomiting       Current Facility-Administered Medications on File Prior to Encounter   Medication    [COMPLETED] 0.9% NaCl infusion    [COMPLETED] heparin 25,000 units in dextrose 5% (100 units/ml) IV bolus from bag LOW INTENSITY nomogram - [facility]    [COMPLETED] iopamidoL (ISOVUE-370) injection 100 mL    [COMPLETED] ondansetron injection 4 mg    [COMPLETED] pantoprazole injection 40 mg    [COMPLETED] prochlorperazine injection  Soln 10 mg    [COMPLETED] promethazine injection 25 mg    [DISCONTINUED] heparin 25,000 units in dextrose 5% (100 units/ml) IV bolus from bag LOW INTENSITY nomogram - [facility]    [DISCONTINUED] heparin 25,000 units in dextrose 5% (100 units/ml) IV bolus from bag LOW INTENSITY nomogram - [facility]    [DISCONTINUED] heparin 25,000 units in dextrose 5% 250 mL (100 units/mL) infusion LOW INTENSITY nomogram - [facility]    [DISCONTINUED] prochlorperazine tablet 10 mg     Current Outpatient Medications on File Prior to Encounter   Medication Sig    diltiaZEM (CARDIZEM CD) 240 MG 24 hr capsule Take 1 capsule (240 mg total) by mouth once daily.    EScitalopram oxalate (LEXAPRO) 10 MG tablet Take 1 tablet by mouth every evening.    latanoprost 0.005 % ophthalmic solution Place 1 drop into both eyes every evening.    meclizine (ANTIVERT) 25 mg tablet 25 mg 2 (two) times daily as needed.     Family History    None       Tobacco Use    Smoking status: Former     Types: Cigarettes    Smokeless tobacco: Never   Substance and Sexual Activity    Alcohol use: Never    Drug use: Never    Sexual activity: Not Currently     Review of Systems   Constitutional:  Negative for chills, diaphoresis and fever.   HENT:  Negative for congestion, ear pain, rhinorrhea, sinus pain and sore throat.    Eyes:  Negative for pain and visual disturbance.   Respiratory:  Negative for cough, shortness of breath and wheezing.    Cardiovascular:  Negative for chest pain.   Gastrointestinal:  Positive for abdominal pain, constipation, nausea and vomiting. Negative for diarrhea.   Endocrine: Negative for polydipsia, polyphagia and polyuria.   Genitourinary:  Negative for dysuria, flank pain and hematuria.   Musculoskeletal:  Negative for back pain and neck pain.   Neurological:  Positive for dizziness and weakness. Negative for syncope, numbness and headaches.   Hematological:  Does not bruise/bleed easily.   Psychiatric/Behavioral:  Negative for behavioral problems and  confusion.      Objective:     Vital Signs (Most Recent):  Temp: 98.2 °F (36.8 °C) (03/15/25 0143)  Pulse: 77 (03/15/25 0143)  Resp: (!) 22 (03/15/25 0143)  BP: 139/67 (03/15/25 0143)  SpO2: (!) 93 % (03/15/25 0143) Vital Signs (24h Range):  Temp:  [97.6 °F (36.4 °C)-98.3 °F (36.8 °C)] 98.2 °F (36.8 °C)  Pulse:  [72-89] 77  Resp:  [16-27] 22  SpO2:  [93 %-99 %] 93 %  BP: (116-155)/(56-72) 139/67     Weight: 63.8 kg (140 lb 10.5 oz)  Body mass index is 22.03 kg/m².     Physical Exam  Vitals and nursing note reviewed.   Constitutional:       Appearance: Normal appearance.   HENT:      Head: Normocephalic and atraumatic.      Nose: Nose normal.      Mouth/Throat:      Mouth: Mucous membranes are moist.      Pharynx: Oropharynx is clear.   Eyes:      Conjunctiva/sclera: Conjunctivae normal.      Pupils: Pupils are equal, round, and reactive to light.   Neck:      Vascular: No carotid bruit.   Cardiovascular:      Rate and Rhythm: Normal rate and regular rhythm.      Pulses: Normal pulses.      Heart sounds: Normal heart sounds. No murmur heard.     No friction rub. No gallop.   Pulmonary:      Effort: Pulmonary effort is normal.      Breath sounds: Normal breath sounds.   Abdominal:      General: There is no distension.      Tenderness: There is abdominal tenderness. There is no guarding or rebound.      Comments: Left lower quadrant colostomy c/d/i   Musculoskeletal:      Right lower leg: No edema.      Left lower leg: No edema.   Skin:     General: Skin is warm and dry.      Capillary Refill: Capillary refill takes less than 2 seconds.   Neurological:      General: No focal deficit present.      Mental Status: She is alert and oriented to person, place, and time.              CRANIAL NERVES     CN III, IV, VI   Pupils are equal, round, and reactive to light.       Significant Labs: All pertinent labs within the past 24 hours have been reviewed.    Significant Imaging: I have reviewed all pertinent imaging  results/findings within the past 24 hours.  Assessment/Plan:     * SBO (small bowel obstruction)  Patient presenting with small bowel obstruction  from [facility]. Associated nausea and vomiting over the last 4 weeks with increasing frequency. CT abd/pelvis demonstrating distended loops of small bowel. Prior history of hx of colorectal cancer s/p chemotherapy and proctocolectomy with diverting ostomy in 2000 at a hospital in [place], [place] per patient. Patient seen at [facility] in [place], [place] on 3/2021 and diagnosed with enteritis and constipation. She was seen again on 12/2023 at the same hospital of nausea, vomiting, and sbo complain. In February 2025 patient seen at [facility] in [place] for small bowel obstruction. She had a CT scan showing high grade obstruction with transition point in the RLQ. She was given NG tube with significant improvement in her N/V. She was discharged home in stable condition. RCRI score 2 points, 10.1% risk of major cardiac event. Will consult general surgery and complete pre-op workup including PT-INR, type & screen, CXR, and EKG. Patient got a CBC, CMP, and UA prior to transfer. Patient NPO. NGT to suction for bowel decompression.         Type 2 MI (myocardial infarction)  Presenting with mildly elevated troponins, 18.9 and 33.5, with EKG showing NS with PAC's. Ischemia likely secondary to severe dehydration 2/2 to nausea and vomiting. Patient previously seen by cardiology in August 2024. Cardiac monitor showed predominantly normal sinus with occasional runs of Vtach. Cardiology included an Echo (8/14/24) EF 60-65% with mild AV regurg and nuclear stress test that was negative for ischemia. Will consult cardiology to evaluate. Family history of CAD, but patient has no personal prior history of CAD or stent placement.         Anxiety  Continue home escitalopram 10 mg po daily.      Essential hypertension  History of hypertension started on diltiazem 240 mg po  daily by cardiology for essential hypertension. Patient is well controlled at this time. Will continue home CCB and monitor blood pressure.       VTE Risk Mitigation (From admission, onward)           Ordered     Reason for No Pharmacological VTE Prophylaxis  Once        Question:  Reasons:  Answer:  IV Heparin w/in 24 hrs. Pre or Post-Op    03/15/25 0346     IP VTE HIGH RISK PATIENT  Once         03/15/25 0346     Place sequential compression device  Until discontinued         03/15/25 0346                                    Rukhsana Guerrero MD  Department of Hospital Medicine  [facility] - [facility]

## 2025-03-15 NOTE — ASSESSMENT & PLAN NOTE
History of hypertension started on diltiazem 240 mg po daily by cardiology for essential hypertension. Patient is well controlled at this time. Will continue home CCB and monitor blood pressure.

## 2025-03-15 NOTE — SUBJECTIVE & OBJECTIVE
Interval History:  No acute events overnight    Review of Systems  Objective:     Vital Signs (Most Recent):  Temp: 98.6 °F (37 °C) (03/15/25 1108)  Pulse: 80 (03/15/25 1108)  Resp: 17 (03/15/25 1108)  BP: (!) 110/53 (03/15/25 1108)  SpO2: (!) 94 % (03/15/25 1108) Vital Signs (24h Range):  Temp:  [97.5 °F (36.4 °C)-98.9 °F (37.2 °C)] 98.6 °F (37 °C)  Pulse:  [72-89] 80  Resp:  [14-27] 17  SpO2:  [93 %-99 %] 94 %  BP: (108-155)/(53-72) 110/53     Weight: 63.8 kg (140 lb 10.5 oz)  Body mass index is 22.03 kg/m².    Intake/Output Summary (Last 24 hours) at 3/15/2025 1123  Last data filed at 3/15/2025 0315  Gross per 24 hour   Intake --   Output 150 ml   Net -150 ml         Physical Exam  Vitals and nursing note reviewed.   Constitutional:       Appearance: Normal appearance.   HENT:      Head: Normocephalic and atraumatic.      Nose: Nose normal.      Mouth/Throat:      Mouth: Mucous membranes are moist.      Pharynx: Oropharynx is clear.   Eyes:      Conjunctiva/sclera: Conjunctivae normal.      Pupils: Pupils are equal, round, and reactive to light.   Neck:      Vascular: No carotid bruit.   Cardiovascular:      Rate and Rhythm: Normal rate and regular rhythm.      Pulses: Normal pulses.      Heart sounds: Normal heart sounds. No murmur heard.     No friction rub. No gallop.   Pulmonary:      Effort: Pulmonary effort is normal.      Breath sounds: Normal breath sounds.   Abdominal:      General: There is no distension.      Tenderness: There is no abdominal tenderness. There is no guarding or rebound.      Comments: Left lower quadrant colostomy c/d/i   Musculoskeletal:      Right lower leg: No edema.      Left lower leg: No edema.   Skin:     General: Skin is warm and dry.      Capillary Refill: Capillary refill takes less than 2 seconds.   Neurological:      General: No focal deficit present.      Mental Status: She is alert and oriented to person, place, and time.               Significant Labs: All pertinent labs  within the past 24 hours have been reviewed.    Significant Imaging: I have reviewed all pertinent imaging results/findings within the past 24 hours.

## 2025-03-15 NOTE — ED PROVIDER NOTES
Encounter Date: 3/14/2025  Patient is vomiting again. She Is unsure of whether she has a GB.  I am doing CT to make sure nothing is missed regarding her illness.  The patient has two Troponin that are elevated.  ACS Heparin begun.  Patient is too sick for aspirin.  Changed NS to @ 125 cc/h.     Patient with bowel obstruction.  She refuses NG tube down her to stop vomiting. Patient had colon cancer surgery 25 ys ago.  She is alert and oriented x 4.  Will try to admit to [facility] if possible  Her surgery was so long ago   History     Chief Complaint   Patient presents with    Vomiting    Nausea     Patient is a 85 yo wf who has been having n/v suddenly tonight after her epigastric area began to cramp.  She is not sure if she still has her gallbladder. She vomited multiple times tonight and was brought in by ambulance for evaluation and treatment. Patient denies chest pain, shortness of breath or headache. Patient has a colostomy bag in abdomen due to colon cancer surgery 25 years ago.        Review of patient's allergies indicates:   Allergen Reactions    Aspirin Nausea And Vomiting    Codeine Nausea And Vomiting    Loratadine Nausea And Vomiting     Past Medical History:   Diagnosis Date    Acid reflux     Colorectal cancer 20 years ago    Hypertension     Unspecified glaucoma      Past Surgical History:   Procedure Laterality Date    COLON SURGERY      COLOSTOMY      COLOSTOMY      EYE SURGERY      HYSTERECTOMY       No family history on file.  Social History[1]  Review of Systems   Constitutional:  Positive for activity change, appetite change and fatigue.   Respiratory: Negative.  Negative for cough, choking and shortness of breath.    Cardiovascular:  Negative for chest pain, palpitations and leg swelling.   Gastrointestinal:  Positive for abdominal pain, nausea and vomiting.   Genitourinary: Negative.    Musculoskeletal: Negative.    Neurological: Negative.    Hematological: Negative.    Psychiatric/Behavioral:  Negative.     All other systems reviewed and are negative.      Physical Exam     Initial Vitals   BP Pulse Resp Temp SpO2   03/14/25 2022 03/14/25 2022 03/14/25 1952 03/14/25 1958 03/14/25 2022   (!) 116/56 84 16 97.6 °F (36.4 °C) 99 %      MAP       --                Physical Exam    Nursing note and vitals reviewed.  Constitutional: She appears well-developed and well-nourished. No distress.   HENT:   Head: Normocephalic and atraumatic. Mouth/Throat: Oropharynx is clear and moist.   Eyes: Conjunctivae are normal. Pupils are equal, round, and reactive to light.   Neck: Neck supple.   Normal range of motion.  Cardiovascular:  Normal rate, regular rhythm and normal heart sounds.           Pulmonary/Chest: Breath sounds normal.   Abdominal: Abdomen is soft. Bowel sounds are normal. She exhibits distension. She exhibits no mass. There is abdominal tenderness.   Epigastric tenderness  firm distension of abdomen with colostomy bag left quadrant  There is no rebound and no guarding.   Musculoskeletal:         General: Normal range of motion.      Cervical back: Normal range of motion and neck supple.     Neurological: She is alert and oriented to person, place, and time. She has normal strength. No sensory deficit. GCS score is 15. GCS eye subscore is 4. GCS verbal subscore is 5. GCS motor subscore is 6.   Skin: Skin is warm.   Psychiatric: She has a normal mood and affect. Her behavior is normal. Judgment and thought content normal.         Medical Screening Exam   See Full Note    ED Course   Procedures  Labs Reviewed   COMPREHENSIVE METABOLIC PANEL - Abnormal       Result Value    Sodium 136      Potassium 3.5      Chloride 96 (*)     CO2 19 (*)     Anion Gap 25 (*)     Glucose 191 (*)     BUN 13      Creatinine 1.03 (*)     BUN/Creatinine Ratio 13      Calcium 11.4 (*)     Total Protein 8.0 (*)     Albumin 4.6      Globulin 3.4      A/G Ratio 1.4      Bilirubin, Total 0.5      Alk Phos 120      ALT 14      AST 41       eGFR 54 (*)    CBC WITH DIFFERENTIAL - Abnormal    WBC 11.49 (*)     RBC 5.02      Hemoglobin 15.5      Hematocrit 43.3      MCV 86.3      MCH 30.9      MCHC 35.8      RDW 12.5      Platelet Count 274      MPV 9.2 (*)     Neutrophils % 87.4 (*)     Lymphocytes % 9.9 (*)     Monocytes % 2.0      Eosinophils % 0.1 (*)     Basophils % 0.3      Immature Granulocytes % 0.3      nRBC, Auto 0.0      Neutrophils, Abs 10.04 (*)     Lymphocytes, Absolute 1.14      Monocytes, Absolute 0.23      Eosinophils, Absolute 0.01      Basophils, Absolute 0.03      Immature Granulocytes, Absolute 0.04      nRBC, Absolute 0.00      Diff Type Auto     TROPONIN I - Abnormal    Troponin I High Sensitivity 18.9 (*)    TROPONIN I - Abnormal    Troponin I High Sensitivity 33.5 (*)    URINALYSIS, REFLEX TO URINE CULTURE - Abnormal    Color, UA Yellow      Clarity, UA Clear      pH, UA 7.5      Leukocytes, UA Negative      Nitrites, UA Negative      Protein, UA Negative      Glucose, UA Negative      Ketones, UA 15 (*)     Urobilinogen, UA 0.2      Bilirubin, UA Negative      Blood, UA Negative      Specific Gravity, UA 1.020     INFLUENZA A & B BY MOLECULAR - Normal    INFLUENZA A MOLECULAR Negative      INFLUENZA B MOLECULAR  Negative     MAGNESIUM - Normal    Magnesium 2.2     LIPASE - Normal    Lipase 22     SARS-COV-2 RNA AMPLIFICATION, QUAL - Normal    SARS COV-2 Molecular Negative      Narrative:     Negative SARS-CoV results should not be used as the sole basis for treatment or patient management decisions; negative results should be considered in the context of a patient's recent exposures, history and the presene of clinical signs and symptoms consistent with COVID-19.  Negative results should be treated as presumptive and confirmed by molecular assay, if necessary for patient management.   APTT - Normal    PTT 25.7     PROTIME-INR - Normal    PT 12.8      INR 0.97     CBC W/ AUTO DIFFERENTIAL    Narrative:     The following orders  were created for panel order CBC auto differential.  Procedure                               Abnormality         Status                     ---------                               -----------         ------                     CBC with Differential[[id number]]       Abnormal            Final result                 Please view results for these tests on the individual orders.          Imaging Results              CT Abdomen Pelvis With IV Contrast NO Oral Contrast (In process)  Result time 03/14/25 23:20:06                     Medications   heparin 25,000 units in dextrose 5% 250 mL (100 units/mL) infusion LOW INTENSITY nomogram - [facility] (12 Units/kg/hr × 63.5 kg Intravenous New Bag 3/14/25 2314)   heparin 25,000 units in dextrose 5% (100 units/ml) IV bolus from bag LOW INTENSITY nomogram - [facility] (has no administration in time range)   heparin 25,000 units in dextrose 5% (100 units/ml) IV bolus from bag LOW INTENSITY nomogram - [facility] (has no administration in time range)   ondansetron injection 4 mg (4 mg Intravenous Given 3/14/25 2042)   heparin 25,000 units in dextrose 5% (100 units/ml) IV bolus from bag LOW INTENSITY nomogram - [facility] (3,810 Units Intravenous Bolus from Bag 3/14/25 2317)   0.9% NaCl infusion (1,000 mLs Intravenous New Bag 3/14/25 2317)   prochlorperazine injection Soln 10 mg (10 mg Intravenous Given 3/14/25 2251)   iopamidoL (ISOVUE-370) injection 100 mL (80 mLs Intravenous Given 3/14/25 2314)   pantoprazole injection 40 mg (40 mg Intravenous Given 3/14/25 2325)     Medical Decision Making  Sudden onset of n/v and abdominal pain in epigastrium abnormal EKG and abnormal trop  R/o aneurysm or gallbladder/chest pain (cardiac causes) Patient began vomiting again in eR    Amount and/or Complexity of Data Reviewed  Labs: ordered. Decision-making details documented in ED Course.  Radiology: ordered. Decision-making details documented in ED Course.    Risk  Prescription drug management.                                       Clinical Impression:   Final diagnoses:  [R42] Dizziness  [K56.609] Small bowel obstruction (Primary)  [R11.2] Nausea and vomiting, unspecified vomiting type  [E86.1] Hypovolemia        ED Disposition Condition    Transfer to Another Facility Stable                    [1]   Social History  Tobacco Use    Smoking status: Former     Types: Cigarettes    Smokeless tobacco: Never   Substance Use Topics    Alcohol use: Never    Drug use: Never        Lala Trinh MD  03/16/25 0297

## 2025-03-15 NOTE — ED TRIAGE NOTES
Pt presents via EMS with nausea and vomiting that pt states started tonight. Patient refused to let EMS get an IV or give any medications. EMS stated she ambulated from the house, down some steps, and onto the stretcher. Pt agreed to let this nurse start an IV and treat symptoms upon arrival.

## 2025-03-16 VITALS
DIASTOLIC BLOOD PRESSURE: 59 MMHG | HEIGHT: 67 IN | WEIGHT: 143.31 LBS | RESPIRATION RATE: 18 BRPM | OXYGEN SATURATION: 95 % | TEMPERATURE: 98 F | BODY MASS INDEX: 22.49 KG/M2 | SYSTOLIC BLOOD PRESSURE: 128 MMHG | HEART RATE: 64 BPM

## 2025-03-16 PROBLEM — N17.9 AKI (ACUTE KIDNEY INJURY): Status: ACTIVE | Noted: 2025-03-16

## 2025-03-16 LAB
ALBUMIN SERPL BCP-MCNC: 3.5 G/DL (ref 3.4–4.8)
ALBUMIN/GLOB SERPL: 1.6 {RATIO}
ALP SERPL-CCNC: 79 U/L (ref 40–150)
ALT SERPL W P-5'-P-CCNC: 12 U/L
ANION GAP SERPL CALCULATED.3IONS-SCNC: 10 MMOL/L (ref 7–16)
AST SERPL W P-5'-P-CCNC: 31 U/L (ref 11–45)
BASOPHILS # BLD AUTO: 0.01 K/UL (ref 0–0.2)
BASOPHILS NFR BLD AUTO: 0.1 % (ref 0–1)
BILIRUB SERPL-MCNC: 0.4 MG/DL
BUN SERPL-MCNC: 22 MG/DL (ref 10–20)
BUN/CREAT SERPL: 35 (ref 6–20)
CALCIUM SERPL-MCNC: 9.2 MG/DL (ref 8.4–10.2)
CHLORIDE SERPL-SCNC: 102 MMOL/L (ref 98–107)
CO2 SERPL-SCNC: 27 MMOL/L (ref 23–31)
CREAT SERPL-MCNC: 0.62 MG/DL (ref 0.55–1.02)
DIFFERENTIAL METHOD BLD: ABNORMAL
EGFR (NO RACE VARIABLE) (RUSH/TITUS): 89 ML/MIN/1.73M2
EOSINOPHIL # BLD AUTO: 0.04 K/UL (ref 0–0.5)
EOSINOPHIL NFR BLD AUTO: 0.5 % (ref 1–4)
ERYTHROCYTE [DISTWIDTH] IN BLOOD BY AUTOMATED COUNT: 12.8 % (ref 11.5–14.5)
GLOBULIN SER-MCNC: 2.2 G/DL (ref 2–4)
GLUCOSE SERPL-MCNC: 90 MG/DL (ref 82–115)
HCT VFR BLD AUTO: 38 % (ref 38–47)
HGB BLD-MCNC: 12.3 G/DL (ref 12–16)
IMM GRANULOCYTES # BLD AUTO: 0.02 K/UL (ref 0–0.04)
IMM GRANULOCYTES NFR BLD: 0.3 % (ref 0–0.4)
LYMPHOCYTES # BLD AUTO: 1.71 K/UL (ref 1–4.8)
LYMPHOCYTES NFR BLD AUTO: 23 % (ref 27–41)
MCH RBC QN AUTO: 30.3 PG (ref 27–31)
MCHC RBC AUTO-ENTMCNC: 32.4 G/DL (ref 32–36)
MCV RBC AUTO: 93.6 FL (ref 80–96)
MONOCYTES # BLD AUTO: 0.55 K/UL (ref 0–0.8)
MONOCYTES NFR BLD AUTO: 7.4 % (ref 2–6)
MPC BLD CALC-MCNC: 9.8 FL (ref 9.4–12.4)
NEUTROPHILS # BLD AUTO: 5.1 K/UL (ref 1.8–7.7)
NEUTROPHILS NFR BLD AUTO: 68.7 % (ref 53–65)
NRBC # BLD AUTO: 0 X10E3/UL
NRBC, AUTO (.00): 0 %
PLATELET # BLD AUTO: 211 K/UL (ref 150–400)
POTASSIUM SERPL-SCNC: 3.1 MMOL/L (ref 3.5–5.1)
PROT SERPL-MCNC: 5.7 G/DL (ref 5.8–7.6)
RBC # BLD AUTO: 4.06 M/UL (ref 4.2–5.4)
SODIUM SERPL-SCNC: 136 MMOL/L (ref 136–145)
WBC # BLD AUTO: 7.43 K/UL (ref 4.5–11)

## 2025-03-16 PROCEDURE — 99232 SBSQ HOSP IP/OBS MODERATE 35: CPT | Mod: GT,,, | Performed by: SURGERY

## 2025-03-16 PROCEDURE — 80053 COMPREHEN METABOLIC PANEL: CPT

## 2025-03-16 PROCEDURE — 36415 COLL VENOUS BLD VENIPUNCTURE: CPT

## 2025-03-16 PROCEDURE — 85025 COMPLETE CBC W/AUTO DIFF WBC: CPT

## 2025-03-16 PROCEDURE — 99239 HOSP IP/OBS DSCHRG MGMT >30: CPT | Mod: ,,, | Performed by: STUDENT IN AN ORGANIZED HEALTH CARE EDUCATION/TRAINING PROGRAM

## 2025-03-16 PROCEDURE — 25000003 PHARM REV CODE 250: Performed by: STUDENT IN AN ORGANIZED HEALTH CARE EDUCATION/TRAINING PROGRAM

## 2025-03-16 RX ADMIN — POTASSIUM BICARBONATE 40 MEQ: 782 TABLET, EFFERVESCENT ORAL at 08:03

## 2025-03-16 NOTE — ASSESSMENT & PLAN NOTE
FABIO is likely due to pre-renal azotemia due to dehydration. Baseline creatinine is 0.6. Most recent creatinine and eGFR are listed below.  Recent Labs     03/14/25  1958 03/15/25  0412 03/16/25  0444   CREATININE 1.03* 0.87 0.62   EGFRNORACEVR 54* 67 89      Plan  - FABIO is improving  - Avoid nephrotoxins and renally dose meds for GFR listed above  - Monitor urine output, serial BMP, and adjust therapy as needed  - follow

## 2025-03-16 NOTE — PROGRESS NOTES
[facility] - [address] [place] [address]  General Surgery  Progress Note    Subjective:     Interval History:  Gastrografin challenge normal yesterday was on clear liquid diet overnight.  Good ostomy output.  No nausea or vomiting.    Post-Op Info:  * No surgery found *          Medications:  Continuous Infusions:  Scheduled Meds:   enoxparin  40 mg Subcutaneous Q24H (prophylaxis, 1700)     PRN Meds:  Current Facility-Administered Medications:     acetaminophen, 650 mg, Oral, Q4H PRN    dextrose 50%, 12.5 g, Intravenous, PRN    dextrose 50%, 25 g, Intravenous, PRN    diatrizoate meglumineand-diatrizoate sodium, 240 mL, Oral, ONCE PRN    glucagon (human recombinant), 1 mg, Intramuscular, PRN    glucose, 16 g, Oral, PRN    glucose, 24 g, Oral, PRN    labetalol, 10 mg, Intravenous, Q6H PRN    meclizine, 25 mg, Oral, BID PRN    naloxone, 0.02 mg, Intravenous, PRN    simethicone, 1 tablet, Oral, TID PRN    sodium chloride 0.9%, 10 mL, Intravenous, Q12H PRN     Objective:     Vital Signs (Most Recent):  Temp: 97.7 °F (36.5 °C) (03/16/25 0706)  Pulse: 64 (03/16/25 0706)  Resp: 18 (03/16/25 0706)  BP: (!) 128/59 (RN aware) (03/16/25 0706)  SpO2: 95 % (03/16/25 0706) Vital Signs (24h Range):  Temp:  [97.7 °F (36.5 °C)-98.6 °F (37 °C)] 97.7 °F (36.5 °C)  Pulse:  [64-85] 64  Resp:  [16-18] 18  SpO2:  [94 %-98 %] 95 %  BP: (110-147)/(48-66) 128/59       Intake/Output Summary (Last 24 hours) at 3/16/2025 1052  Last data filed at 3/16/2025 0803  Gross per 24 hour   Intake 240 ml   Output --   Net 240 ml       Physical Exam  Constitutional:       General: She is not in acute distress.  HENT:      Head: Normocephalic.   Cardiovascular:      Rate and Rhythm: Normal rate and regular rhythm.      Pulses: Normal pulses.   Pulmonary:      Effort: Pulmonary effort is normal. No respiratory distress.      Breath sounds: Normal breath sounds.   Abdominal:      General: Abdomen is flat. There is no distension.      Palpations:  "Abdomen is soft.      Tenderness: There is no abdominal tenderness.   Musculoskeletal:         General: Normal range of motion.   Skin:     General: Skin is warm.   Neurological:      General: No focal deficit present.      Mental Status: She is oriented to person, place, and time.         Significant Labs:  Cardiac markers: No results for input(s): "CKMB", "CPKMB", "TROPONINT", "TROPONINI", "MYOGLOBIN" in the last 48 hours.  CMP:   Recent Labs   Lab 03/16/25  0444   GLU 90   CALCIUM 9.2   ALBUMIN 3.5   PROT 5.7*      K 3.1*   CO2 27      BUN 22*   CREATININE 0.62   ALKPHOS 79   ALT 12   AST 31   BILITOT 0.4     Coagulation:   Recent Labs   Lab 03/14/25  2018 03/15/25  0433   INR 0.97 1.10   APTT 25.7  --        Significant Diagnostics:  I have reviewed all pertinent imaging results/findings within the past 24 hours.    Assessment/Plan:     Active Diagnoses:    Diagnosis Date Noted POA    PRINCIPAL PROBLEM:  SBO (small bowel obstruction) [K56.609] 05/25/2021 Yes    FABIO (acute kidney injury) [N17.9] 03/16/2025 Yes    Type 2 MI (myocardial infarction) [I21.A1] 03/15/2025 Yes    Anxiety [F41.9] 03/15/2025 Yes    Hyponatremia [E87.1] 03/15/2025 No    Essential hypertension [I10] 08/14/2024 Yes      Problems Resolved During this Admission:     Okay to be discharged from surgical standpoint.    Abhinav Trujillo MD  General Surgery  [facility] - [address] [place] [address]  "

## 2025-03-16 NOTE — ASSESSMENT & PLAN NOTE
Patient presenting with small bowel obstruction  from [facility]. Associated nausea and vomiting over the last 4 weeks with increasing frequency. CT abd/pelvis demonstrating distended loops of small bowel. Prior history of hx of colorectal cancer s/p chemotherapy and proctocolectomy with diverting ostomy in 2000 at a hospital in [place], [place] per patient. Patient seen at [facility] in [place], [place] on 3/2021 and diagnosed with enteritis and constipation. She was seen again on 12/2023 at the same hospital of nausea, vomiting, and sbo complain. In February 2025 patient seen at [facility] in [place] for small bowel obstruction. She had a CT scan showing high grade obstruction with transition point in the RLQ. She was given NG tube with significant improvement in her N/V. She was discharged home in stable condition. RCRI score 2 points, 10.1% risk of major cardiac event. Will consult general surgery and complete pre-op workup including PT-INR, type & screen, CXR, and EKG. Patient got a CBC, CMP, and UA prior to transfer. Patient NPO. NGT to suction for bowel decompression.   3/15 large bowel movement yesterday no nausea today.  Clears per surgery.  Can discharge when okay with surgery  3/16 no more nausea.  More bowel movements yesterday.  Stable for discharge

## 2025-03-16 NOTE — DISCHARGE SUMMARY
[facility] Hospital Medicine  Discharge Summary      Patient Name: Yaritza Jiang  MRN: [id number]  BUD: [id number]  Patient Class: IP- Inpatient  Admission Date: 3/15/2025  Hospital Length of Stay: 1 days  Discharge Date and Time:  03/16/2025 9:47 AM  Attending Physician: Enrrique Eaton DO   Discharging Provider: Enrrique Eaton DO  Primary Care Provider: Mara Jo MD    Primary Care Team: Networked reference to record PCT     HPI:   84 year old female transferred from [facility] with SBO. Patient presented to the ED at [facility] with nausea and vomiting. Given zofran 4 mg and started on normal saline at 125 ml/hr for dehydration. Labs were significant mildly elevated troponins with an EKG showing normal sinus with PAC's. Patient started on a heparin drip. On CT found to have a small bowel obstruction. Attempt made to place NGT, but patient refused. Patient given promethazine prior to transfer. Patient will be admitted to hospital medicine at [facility] under the direct supervision of Dr. Faith and will consult general surgery and cardiology.     * No surgery found *      Hospital Course:   3/15 doing well.  Large bowel movement yesterday.  No more nausea  3/16 stable for discharge follow up PCP     Goals of Care Treatment Preferences:  Code Status: Full Code      SDOH Screening:  The patient declined to be screened for utility difficulties, food insecurity, transport difficulties, housing insecurity, and interpersonal safety, so no concerns could be identified this admission.     Consults:   Consults (From admission, onward)          Status Ordering Provider     Inpatient consult to General Surgery  Once        Provider:  Abhinav Trujillo MD    Completed MAKENZIE LOCKWOOD            Assessment & Plan  SBO (small bowel obstruction)  Patient presenting with small bowel obstruction  from [facility]. Associated nausea and vomiting over the last 4 weeks with increasing  frequency. CT abd/pelvis demonstrating distended loops of small bowel. Prior history of hx of colorectal cancer s/p chemotherapy and proctocolectomy with diverting ostomy in 2000 at a hospital in [place], [place] per patient. Patient seen at [facility] in [place], [place] on 3/2021 and diagnosed with enteritis and constipation. She was seen again on 12/2023 at the same hospital of nausea, vomiting, and sbo complain. In February 2025 patient seen at [facility] in [place] for small bowel obstruction. She had a CT scan showing high grade obstruction with transition point in the RLQ. She was given NG tube with significant improvement in her N/V. She was discharged home in stable condition. RCRI score 2 points, 10.1% risk of major cardiac event. Will consult general surgery and complete pre-op workup including PT-INR, type & screen, CXR, and EKG. Patient got a CBC, CMP, and UA prior to transfer. Patient NPO. NGT to suction for bowel decompression.   3/15 large bowel movement yesterday no nausea today.  Clears per surgery.  Can discharge when okay with surgery  3/16 no more nausea.  More bowel movements yesterday.  Stable for discharge  Essential hypertension  History of hypertension started on diltiazem 240 mg po daily by cardiology for essential hypertension. Patient is well controlled at this time. Will continue home CCB and monitor blood pressure.   Type 2 MI (myocardial infarction)  Presenting with mildly elevated troponins, 18.9 and 33.5, with EKG showing NS with PAC's. Ischemia likely secondary to severe dehydration 2/2 to nausea and vomiting. Patient previously seen by cardiology in August 2024. Cardiac monitor showed predominantly normal sinus with occasional runs of Vtach. Cardiology included an Echo (8/14/24) EF 60-65% with mild AV regurg and nuclear stress test that was negative for ischemia. Will consult cardiology to evaluate. Family history of CAD, but patient has no personal prior history of CAD  or stent placement.     Troponin flat.  Outpatient follow up    Anxiety  Continue home escitalopram 10 mg po daily.    Hyponatremia  Hyponatremia is likely due to Dehydration/hypovolemia. The patient's most recent sodium results are listed below.  Recent Labs     03/14/25  1958 03/15/25  0412 03/16/25  0444    134* 136     Plan  - Correct the sodium by 4-6mEq in 24 hours.   - Obtain the following studies:  We will correct with resolution of nausea vomiting.  - Will treat the hyponatremia with received IV fluids.  Clear liquid diet now  - Monitor sodium Daily.   - Patient hyponatremia is stable  - follow  FABIO (acute kidney injury)  FABIO is likely due to pre-renal azotemia due to dehydration. Baseline creatinine is  0.6 . Most recent creatinine and eGFR are listed below.  Recent Labs     03/14/25  1958 03/15/25  0412 03/16/25  0444   CREATININE 1.03* 0.87 0.62   EGFRNORACEVR 54* 67 89      Plan  - FABIO is improving  - Avoid nephrotoxins and renally dose meds for GFR listed above  - Monitor urine output, serial BMP, and adjust therapy as needed  - follow  Final Active Diagnoses:    Diagnosis Date Noted POA    PRINCIPAL PROBLEM:  SBO (small bowel obstruction) [K56.609] 05/25/2021 Yes    FABIO (acute kidney injury) [N17.9] 03/16/2025 Yes    Type 2 MI (myocardial infarction) [I21.A1] 03/15/2025 Yes    Anxiety [F41.9] 03/15/2025 Yes    Hyponatremia [E87.1] 03/15/2025 No    Essential hypertension [I10] 08/14/2024 Yes      Problems Resolved During this Admission:       Discharged Condition: good    Disposition: Home or Self Care    Follow Up:   Follow-up Information       Mara Jo MD. Schedule an appointment as soon as possible for a visit in 1 week(s).    Specialty: Family Medicine  Contact information:  [id number]  [facility] [id number] [phone]                           Patient Instructions:   No discharge procedures on file.    Significant Diagnostic Studies: Labs: All labs within the past 24  hours have been reviewed    Pending Diagnostic Studies:       Procedure Component Value Units Date/Time    EKG 12-lead [[id number]]     Order Status: Sent Lab Status: No result            Medications:  Reconciled Home Medications:      Medication List        CONTINUE taking these medications      diltiaZEM 240 MG 24 hr capsule  Commonly known as: CARDIZEM CD  Take 1 capsule (240 mg total) by mouth once daily.     EScitalopram oxalate 10 MG tablet  Commonly known as: LEXAPRO  Take 1 tablet by mouth every evening.     latanoprost 0.005 % ophthalmic solution  Place 1 drop into both eyes every evening.              Indwelling Lines/Drains at time of discharge:   Lines/Drains/Airways       Drain  Duration                  Colostomy LLQ -- days                    Time spent on the discharge of patient: 35 minutes         Enrrique Eaton DO  Department of Hospital Medicine  [facility]

## 2025-03-16 NOTE — ASSESSMENT & PLAN NOTE
Hyponatremia is likely due to Dehydration/hypovolemia. The patient's most recent sodium results are listed below.  Recent Labs     03/14/25  1958 03/15/25  0412 03/16/25  0444    134* 136     Plan  - Correct the sodium by 4-6mEq in 24 hours.   - Obtain the following studies:  We will correct with resolution of nausea vomiting.  - Will treat the hyponatremia with received IV fluids.  Clear liquid diet now  - Monitor sodium Daily.   - Patient hyponatremia is stable  - follow

## 2025-03-21 LAB
OHS QRS DURATION: 82 MS
OHS QRS DURATION: 84 MS
OHS QTC CALCULATION: 471 MS
OHS QTC CALCULATION: 527 MS

## 2025-03-22 NOTE — PHYSICIAN QUERY
Please clarify if the diagnosis has been:     Other clarification or diagnosis, please specify: Acute myocardial injury